# Patient Record
Sex: MALE | Race: WHITE | NOT HISPANIC OR LATINO | Employment: FULL TIME | URBAN - METROPOLITAN AREA
[De-identification: names, ages, dates, MRNs, and addresses within clinical notes are randomized per-mention and may not be internally consistent; named-entity substitution may affect disease eponyms.]

---

## 2020-03-14 ENCOUNTER — HISTORICAL (OUTPATIENT)
Dept: LAB | Facility: HOSPITAL | Age: 35
End: 2020-03-14

## 2020-09-19 ENCOUNTER — HISTORICAL (OUTPATIENT)
Dept: LAB | Facility: HOSPITAL | Age: 35
End: 2020-09-19

## 2020-09-19 LAB
ALBUMIN SERPL-MCNC: 4.2 GM/DL (ref 3.4–5)
ALBUMIN/GLOB SERPL: 1.3 RATIO (ref 1.1–2)
ALP SERPL-CCNC: 87 UNIT/L (ref 46–116)
ALT SERPL-CCNC: 72 UNIT/L (ref 12–78)
AST SERPL-CCNC: 35 UNIT/L (ref 15–37)
BILIRUB SERPL-MCNC: 0.8 MG/DL (ref 0.2–1)
BILIRUBIN DIRECT+TOT PNL SERPL-MCNC: 0.22 MG/DL (ref 0–0.2)
BILIRUBIN DIRECT+TOT PNL SERPL-MCNC: 0.58 MG/DL (ref 0–0.8)
BUN SERPL-MCNC: 17.9 MG/DL (ref 7–18)
CALCIUM SERPL-MCNC: 8.8 MG/DL (ref 8.5–10.1)
CHLORIDE SERPL-SCNC: 106 MMOL/L (ref 98–107)
CHOLEST SERPL-MCNC: 177 MG/DL (ref 0–200)
CHOLEST/HDLC SERPL: 3.1 {RATIO} (ref 0–5)
CO2 SERPL-SCNC: 28.2 MMOL/L (ref 21–32)
CREAT SERPL-MCNC: 0.98 MG/DL (ref 0.6–1.3)
GLOBULIN SER-MCNC: 3.2 GM/DL (ref 2.4–3.5)
GLUCOSE SERPL-MCNC: 102 MG/DL (ref 74–106)
HDLC SERPL-MCNC: 58 MG/DL (ref 40–60)
LDLC SERPL CALC-MCNC: 108 MG/DL (ref 0–129)
POTASSIUM SERPL-SCNC: 4.5 MMOL/L (ref 3.5–5.1)
PROT SERPL-MCNC: 7.4 GM/DL (ref 6.4–8.2)
SODIUM SERPL-SCNC: 140 MMOL/L (ref 136–145)
TRIGL SERPL-MCNC: 57 MG/DL
VLDLC SERPL CALC-MCNC: 11 MG/DL

## 2021-04-05 ENCOUNTER — HISTORICAL (OUTPATIENT)
Dept: LAB | Facility: HOSPITAL | Age: 36
End: 2021-04-05

## 2021-04-05 LAB
ABS NEUT (OLG): 6.97 X10(3)/MCL (ref 2.1–9.2)
ALBUMIN SERPL-MCNC: 4.7 GM/DL (ref 3.5–5)
ALBUMIN/GLOB SERPL: 1.4 RATIO (ref 1.1–2)
ALP SERPL-CCNC: 104 UNIT/L (ref 40–150)
ALT SERPL-CCNC: 80 UNIT/L (ref 0–55)
AST SERPL-CCNC: 48 UNIT/L (ref 5–34)
BASOPHILS # BLD AUTO: 0 X10(3)/MCL (ref 0–0.2)
BASOPHILS NFR BLD AUTO: 0 %
BILIRUB SERPL-MCNC: 1.9 MG/DL
BILIRUBIN DIRECT+TOT PNL SERPL-MCNC: 0.6 MG/DL (ref 0–0.5)
BILIRUBIN DIRECT+TOT PNL SERPL-MCNC: 1.3 MG/DL (ref 0–0.8)
BUN SERPL-MCNC: 24.8 MG/DL (ref 8.9–20.6)
CALCIUM SERPL-MCNC: 9.3 MG/DL (ref 8.4–10.2)
CHLORIDE SERPL-SCNC: 103 MMOL/L (ref 98–107)
CHOLEST SERPL-MCNC: 174 MG/DL
CHOLEST/HDLC SERPL: 3 {RATIO} (ref 0–5)
CO2 SERPL-SCNC: 21 MMOL/L (ref 22–29)
CREAT SERPL-MCNC: 1.13 MG/DL (ref 0.73–1.18)
EOSINOPHIL # BLD AUTO: 0.1 X10(3)/MCL (ref 0–0.9)
EOSINOPHIL NFR BLD AUTO: 1 %
ERYTHROCYTE [DISTWIDTH] IN BLOOD BY AUTOMATED COUNT: 13.7 % (ref 11.5–17)
GLOBULIN SER-MCNC: 3.4 GM/DL (ref 2.4–3.5)
GLUCOSE SERPL-MCNC: 79 MG/DL (ref 74–100)
HCT VFR BLD AUTO: 46.1 % (ref 42–52)
HDLC SERPL-MCNC: 56 MG/DL (ref 35–60)
HGB BLD-MCNC: 14.8 GM/DL (ref 14–18)
IMM GRANULOCYTES # BLD AUTO: 0.01 % (ref 0–0.02)
IMM GRANULOCYTES NFR BLD AUTO: 0.1 % (ref 0–0.43)
LDLC SERPL CALC-MCNC: 99 MG/DL (ref 50–140)
LYMPHOCYTES # BLD AUTO: 1.8 X10(3)/MCL (ref 0.6–4.6)
LYMPHOCYTES NFR BLD AUTO: 19 %
MCH RBC QN AUTO: 29.4 PG (ref 27–31)
MCHC RBC AUTO-ENTMCNC: 32.1 GM/DL (ref 33–36)
MCV RBC AUTO: 91.5 FL (ref 80–94)
MONOCYTES # BLD AUTO: 0.8 X10(3)/MCL (ref 0.1–1.3)
MONOCYTES NFR BLD AUTO: 8 %
NEUTROPHILS # BLD AUTO: 6.97 X10(3)/MCL (ref 1.4–7.9)
NEUTROPHILS NFR BLD AUTO: 72 %
PLATELET # BLD AUTO: 198 X10(3)/MCL (ref 130–400)
PMV BLD AUTO: 9.3 FL (ref 9.4–12.4)
POTASSIUM SERPL-SCNC: 4.1 MMOL/L (ref 3.5–5.1)
PROT SERPL-MCNC: 8.1 GM/DL (ref 6.4–8.3)
RBC # BLD AUTO: 5.04 X10(6)/MCL (ref 4.7–6.1)
SODIUM SERPL-SCNC: 139 MMOL/L (ref 136–145)
TRIGL SERPL-MCNC: 95 MG/DL (ref 34–140)
TSH SERPL-ACNC: 1.14 UIU/ML (ref 0.35–4.94)
VLDLC SERPL CALC-MCNC: 19 MG/DL
WBC # SPEC AUTO: 9.7 X10(3)/MCL (ref 4.5–11.5)

## 2021-04-14 ENCOUNTER — HISTORICAL (OUTPATIENT)
Dept: RADIOLOGY | Facility: HOSPITAL | Age: 36
End: 2021-04-14

## 2021-05-24 ENCOUNTER — HISTORICAL (OUTPATIENT)
Dept: LAB | Facility: HOSPITAL | Age: 36
End: 2021-05-24

## 2021-10-06 ENCOUNTER — HISTORICAL (OUTPATIENT)
Dept: LAB | Facility: HOSPITAL | Age: 36
End: 2021-10-06

## 2022-04-07 ENCOUNTER — HISTORICAL (OUTPATIENT)
Dept: ADMINISTRATIVE | Facility: HOSPITAL | Age: 37
End: 2022-04-07

## 2022-04-18 ENCOUNTER — HISTORICAL (OUTPATIENT)
Dept: LAB | Facility: HOSPITAL | Age: 37
End: 2022-04-18
Payer: COMMERCIAL

## 2022-04-18 LAB
ABS NEUT (OLG): 3.3 (ref 2.1–9.2)
ALBUMIN SERPL-MCNC: 4.2 G/DL (ref 3.5–5)
ALBUMIN/GLOB SERPL: 1.2 {RATIO} (ref 1.1–2)
ALP SERPL-CCNC: 97 U/L (ref 40–150)
ALT SERPL-CCNC: 81 U/L (ref 0–55)
AST SERPL-CCNC: 44 U/L (ref 5–34)
BASOPHILS # BLD AUTO: 0 10*3/UL (ref 0–0.2)
BASOPHILS NFR BLD AUTO: 1 %
BILIRUB SERPL-MCNC: 1.5 MG/DL
BILIRUBIN DIRECT+TOT PNL SERPL-MCNC: 0.5 (ref 0–0.5)
BILIRUBIN DIRECT+TOT PNL SERPL-MCNC: 1 (ref 0–0.8)
BUN SERPL-MCNC: 19.1 MG/DL (ref 8.9–20.6)
CALCIUM SERPL-MCNC: 9.4 MG/DL (ref 8.7–10.5)
CHLORIDE SERPL-SCNC: 104 MMOL/L (ref 98–107)
CHOLEST SERPL-MCNC: 175 MG/DL
CHOLEST/HDLC SERPL: 3 {RATIO} (ref 0–5)
CO2 SERPL-SCNC: 23 MMOL/L (ref 22–29)
CREAT SERPL-MCNC: 0.83 MG/DL (ref 0.73–1.18)
EOSINOPHIL # BLD AUTO: 0.2 10*3/UL (ref 0–0.9)
EOSINOPHIL NFR BLD AUTO: 4 %
ERYTHROCYTE [DISTWIDTH] IN BLOOD BY AUTOMATED COUNT: 12.5 % (ref 11.5–17)
GLOBULIN SER-MCNC: 3.5 G/DL (ref 2.4–3.5)
GLUCOSE SERPL-MCNC: 86 MG/DL (ref 74–100)
HCT VFR BLD AUTO: 42 % (ref 42–52)
HDLC SERPL-MCNC: 52 MG/DL (ref 35–60)
HEMOLYSIS INTERF INDEX SERPL-ACNC: 4
HGB BLD-MCNC: 13.7 G/DL (ref 14–18)
ICTERIC INTERF INDEX SERPL-ACNC: 2
LDLC SERPL CALC-MCNC: 103 MG/DL (ref 50–140)
LIPEMIC INTERF INDEX SERPL-ACNC: 4
LYMPHOCYTES # BLD AUTO: 2 10*3/UL (ref 0.6–4.6)
LYMPHOCYTES NFR BLD AUTO: 32 %
MANUAL DIFF? (OHS): NO
MCH RBC QN AUTO: 30.5 PG (ref 27–31)
MCHC RBC AUTO-ENTMCNC: 32.6 G/DL (ref 33–36)
MCV RBC AUTO: 93.5 FL (ref 80–94)
MONOCYTES # BLD AUTO: 0.6 10*3/UL (ref 0.1–1.3)
MONOCYTES NFR BLD AUTO: 9 %
NEUTROPHILS # BLD AUTO: 3.3 10*3/UL (ref 1.4–7.9)
NEUTROPHILS NFR BLD AUTO: 54 %
PLATELET # BLD AUTO: 177 10*3/UL (ref 130–400)
PMV BLD AUTO: 8.6 FL (ref 9.4–12.4)
POTASSIUM SERPL-SCNC: 4 MMOL/L (ref 3.5–5.1)
PROT SERPL-MCNC: 7.7 G/DL (ref 6.4–8.3)
RBC # BLD AUTO: 4.49 10*6/UL (ref 4.7–6.1)
SODIUM SERPL-SCNC: 140 MMOL/L (ref 136–145)
TRIGL SERPL-MCNC: 99 MG/DL (ref 34–140)
TSH SERPL-ACNC: 1.78 M[IU]/L (ref 0.35–4.94)
VLDLC SERPL CALC-MCNC: 20 MG/DL
WBC # SPEC AUTO: 6.1 10*3/UL (ref 4.5–11.5)

## 2022-04-24 VITALS
WEIGHT: 227.06 LBS | OXYGEN SATURATION: 94 % | HEIGHT: 68 IN | BODY MASS INDEX: 34.41 KG/M2 | DIASTOLIC BLOOD PRESSURE: 82 MMHG | SYSTOLIC BLOOD PRESSURE: 137 MMHG

## 2023-04-18 ENCOUNTER — TELEPHONE (OUTPATIENT)
Dept: FAMILY MEDICINE | Facility: CLINIC | Age: 38
End: 2023-04-18
Payer: COMMERCIAL

## 2023-04-18 DIAGNOSIS — Z00.00 ANNUAL PHYSICAL EXAM: Primary | ICD-10-CM

## 2023-04-18 DIAGNOSIS — Z11.4 SCREENING FOR HIV (HUMAN IMMUNODEFICIENCY VIRUS): ICD-10-CM

## 2023-04-18 DIAGNOSIS — Z11.59 NEED FOR HEPATITIS C SCREENING TEST: ICD-10-CM

## 2023-04-18 PROBLEM — E66.9 OBESITY: Status: ACTIVE | Noted: 2023-04-18

## 2023-04-18 PROBLEM — E78.5 HYPERLIPIDEMIA: Status: ACTIVE | Noted: 2023-04-18

## 2023-04-18 PROBLEM — K21.9 GASTROESOPHAGEAL REFLUX DISEASE: Status: ACTIVE | Noted: 2023-04-18

## 2023-04-18 PROBLEM — K76.0 STEATOSIS OF LIVER: Status: ACTIVE | Noted: 2023-04-18

## 2023-04-18 PROBLEM — G47.33 OBSTRUCTIVE SLEEP APNEA SYNDROME: Status: ACTIVE | Noted: 2023-04-18

## 2023-04-18 PROBLEM — R74.8 ELEVATED LIVER ENZYMES: Status: ACTIVE | Noted: 2023-04-18

## 2023-04-18 NOTE — TELEPHONE ENCOUNTER
Are there any outstanding tasks in patient's chart?    n  2. Do we have outstanding/pending referrals?  n    3. Has the patient been seen in an ER, Urgent Care, or admitted since last visit?  [facility]    4. Has patient seen any other health care providers since last visit?  n    5.  Has patient had any blood work or x-rays done since last visit?   N      Please order wellness labs needed at [facility]

## 2023-04-22 ENCOUNTER — LAB VISIT (OUTPATIENT)
Dept: LAB | Facility: HOSPITAL | Age: 38
End: 2023-04-22
Attending: NURSE PRACTITIONER
Payer: COMMERCIAL

## 2023-04-22 DIAGNOSIS — Z11.59 NEED FOR HEPATITIS C SCREENING TEST: ICD-10-CM

## 2023-04-22 DIAGNOSIS — D64.9 ANEMIA, UNSPECIFIED TYPE: ICD-10-CM

## 2023-04-22 DIAGNOSIS — Z11.4 SCREENING FOR HIV (HUMAN IMMUNODEFICIENCY VIRUS): ICD-10-CM

## 2023-04-22 DIAGNOSIS — Z00.00 ANNUAL PHYSICAL EXAM: ICD-10-CM

## 2023-04-22 LAB
ALBUMIN SERPL-MCNC: 3.9 G/DL (ref 3.5–5)
ALBUMIN/GLOB SERPL: 1.2 RATIO (ref 1.1–2)
ALP SERPL-CCNC: 114 UNIT/L (ref 40–150)
ALT SERPL-CCNC: 81 UNIT/L (ref 0–55)
AST SERPL-CCNC: 66 UNIT/L (ref 5–34)
BASOPHILS # BLD AUTO: 0.02 X10(3)/MCL (ref 0–0.2)
BASOPHILS NFR BLD AUTO: 0.4 %
BILIRUBIN DIRECT+TOT PNL SERPL-MCNC: 0.9 MG/DL
BUN SERPL-MCNC: 15.6 MG/DL (ref 8.9–20.6)
CALCIUM SERPL-MCNC: 9 MG/DL (ref 8.4–10.2)
CHLORIDE SERPL-SCNC: 108 MMOL/L (ref 98–107)
CHOLEST SERPL-MCNC: 178 MG/DL
CHOLEST/HDLC SERPL: 4 {RATIO} (ref 0–5)
CO2 SERPL-SCNC: 25 MMOL/L (ref 22–29)
CREAT SERPL-MCNC: 0.87 MG/DL (ref 0.73–1.18)
EOSINOPHIL # BLD AUTO: 0.25 X10(3)/MCL (ref 0–0.9)
EOSINOPHIL NFR BLD AUTO: 4.9 %
ERYTHROCYTE [DISTWIDTH] IN BLOOD BY AUTOMATED COUNT: 13.1 % (ref 11.5–17)
EST. AVERAGE GLUCOSE BLD GHB EST-MCNC: 114 MG/DL
GFR SERPLBLD CREATININE-BSD FMLA CKD-EPI: >60 MLS/MIN/1.73/M2
GLOBULIN SER-MCNC: 3.2 GM/DL (ref 2.4–3.5)
GLUCOSE SERPL-MCNC: 100 MG/DL (ref 74–100)
HBA1C MFR BLD: 5.6 %
HCT VFR BLD AUTO: 39.2 % (ref 42–52)
HDLC SERPL-MCNC: 49 MG/DL (ref 35–60)
HGB BLD-MCNC: 12.6 G/DL (ref 14–18)
IMM GRANULOCYTES # BLD AUTO: 0 X10(3)/MCL (ref 0–0.04)
IMM GRANULOCYTES NFR BLD AUTO: 0 %
LDLC SERPL CALC-MCNC: 109 MG/DL (ref 50–140)
LYMPHOCYTES # BLD AUTO: 2.02 X10(3)/MCL (ref 0.6–4.6)
LYMPHOCYTES NFR BLD AUTO: 39.5 %
MCH RBC QN AUTO: 27.8 PG (ref 27–31)
MCHC RBC AUTO-ENTMCNC: 32.1 G/DL (ref 33–36)
MCV RBC AUTO: 86.3 FL (ref 80–94)
MONOCYTES # BLD AUTO: 0.5 X10(3)/MCL (ref 0.1–1.3)
MONOCYTES NFR BLD AUTO: 9.8 %
NEUTROPHILS # BLD AUTO: 2.32 X10(3)/MCL (ref 2.1–9.2)
NEUTROPHILS NFR BLD AUTO: 45.4 %
PLATELET # BLD AUTO: 180 X10(3)/MCL (ref 130–400)
PMV BLD AUTO: 8.8 FL (ref 7.4–10.4)
POTASSIUM SERPL-SCNC: 4 MMOL/L (ref 3.5–5.1)
PROT SERPL-MCNC: 7.1 GM/DL (ref 6.4–8.3)
RBC # BLD AUTO: 4.54 X10(6)/MCL (ref 4.7–6.1)
SODIUM SERPL-SCNC: 141 MMOL/L (ref 136–145)
TRIGL SERPL-MCNC: 101 MG/DL (ref 34–140)
TSH SERPL-ACNC: 2.39 UIU/ML (ref 0.35–4.94)
VLDLC SERPL CALC-MCNC: 20 MG/DL
WBC # SPEC AUTO: 5.1 X10(3)/MCL (ref 4.5–11.5)

## 2023-04-22 PROCEDURE — 80053 COMPREHEN METABOLIC PANEL: CPT

## 2023-04-22 PROCEDURE — 80061 LIPID PANEL: CPT

## 2023-04-22 PROCEDURE — 86803 HEPATITIS C AB TEST: CPT

## 2023-04-22 PROCEDURE — 85025 COMPLETE CBC W/AUTO DIFF WBC: CPT

## 2023-04-22 PROCEDURE — 83036 HEMOGLOBIN GLYCOSYLATED A1C: CPT

## 2023-04-22 PROCEDURE — 87389 HIV-1 AG W/HIV-1&-2 AB AG IA: CPT

## 2023-04-22 PROCEDURE — 36415 COLL VENOUS BLD VENIPUNCTURE: CPT

## 2023-04-22 PROCEDURE — 83550 IRON BINDING TEST: CPT

## 2023-04-22 PROCEDURE — 84443 ASSAY THYROID STIM HORMONE: CPT

## 2023-04-24 LAB
HCV AB SERPL QL IA: NONREACTIVE
HIV 1+2 AB+HIV1 P24 AG SERPL QL IA: NONREACTIVE

## 2023-04-25 ENCOUNTER — PATIENT MESSAGE (OUTPATIENT)
Dept: FAMILY MEDICINE | Facility: CLINIC | Age: 38
End: 2023-04-25

## 2023-04-25 ENCOUNTER — OFFICE VISIT (OUTPATIENT)
Dept: FAMILY MEDICINE | Facility: CLINIC | Age: 38
End: 2023-04-25
Payer: COMMERCIAL

## 2023-04-25 VITALS
OXYGEN SATURATION: 97 % | WEIGHT: 251.81 LBS | BODY MASS INDEX: 38.16 KG/M2 | RESPIRATION RATE: 16 BRPM | HEART RATE: 85 BPM | SYSTOLIC BLOOD PRESSURE: 124 MMHG | DIASTOLIC BLOOD PRESSURE: 72 MMHG | TEMPERATURE: 99 F | HEIGHT: 68 IN

## 2023-04-25 DIAGNOSIS — K76.0 STEATOSIS OF LIVER: ICD-10-CM

## 2023-04-25 DIAGNOSIS — D64.9 ANEMIA, UNSPECIFIED TYPE: ICD-10-CM

## 2023-04-25 DIAGNOSIS — E66.01 MORBID OBESITY: ICD-10-CM

## 2023-04-25 DIAGNOSIS — Z11.4 SCREENING FOR HIV (HUMAN IMMUNODEFICIENCY VIRUS): ICD-10-CM

## 2023-04-25 DIAGNOSIS — E78.5 HYPERLIPIDEMIA, UNSPECIFIED HYPERLIPIDEMIA TYPE: ICD-10-CM

## 2023-04-25 DIAGNOSIS — Z00.00 ANNUAL PHYSICAL EXAM: Primary | ICD-10-CM

## 2023-04-25 DIAGNOSIS — R74.8 ELEVATED LIVER ENZYMES: ICD-10-CM

## 2023-04-25 DIAGNOSIS — Z11.59 NEED FOR HEPATITIS C SCREENING TEST: ICD-10-CM

## 2023-04-25 DIAGNOSIS — G47.33 OBSTRUCTIVE SLEEP APNEA SYNDROME: ICD-10-CM

## 2023-04-25 LAB
IRON SATN MFR SERPL: 24 % (ref 20–50)
IRON SERPL-MCNC: 82 UG/DL (ref 65–175)
TIBC SERPL-MCNC: 263 UG/DL (ref 69–240)
TIBC SERPL-MCNC: 345 UG/DL (ref 250–450)
TRANSFERRIN SERPL-MCNC: 321 MG/DL (ref 174–364)

## 2023-04-25 PROCEDURE — 3078F DIAST BP <80 MM HG: CPT | Mod: CPTII,,, | Performed by: NURSE PRACTITIONER

## 2023-04-25 PROCEDURE — 3078F PR MOST RECENT DIASTOLIC BLOOD PRESSURE < 80 MM HG: ICD-10-PCS | Mod: CPTII,,, | Performed by: NURSE PRACTITIONER

## 2023-04-25 PROCEDURE — 1159F MED LIST DOCD IN RCRD: CPT | Mod: CPTII,,, | Performed by: NURSE PRACTITIONER

## 2023-04-25 PROCEDURE — 1160F RVW MEDS BY RX/DR IN RCRD: CPT | Mod: CPTII,,, | Performed by: NURSE PRACTITIONER

## 2023-04-25 PROCEDURE — 1159F PR MEDICATION LIST DOCUMENTED IN MEDICAL RECORD: ICD-10-PCS | Mod: CPTII,,, | Performed by: NURSE PRACTITIONER

## 2023-04-25 PROCEDURE — 3008F PR BODY MASS INDEX (BMI) DOCUMENTED: ICD-10-PCS | Mod: CPTII,,, | Performed by: NURSE PRACTITIONER

## 2023-04-25 PROCEDURE — 3074F PR MOST RECENT SYSTOLIC BLOOD PRESSURE < 130 MM HG: ICD-10-PCS | Mod: CPTII,,, | Performed by: NURSE PRACTITIONER

## 2023-04-25 PROCEDURE — 1160F PR REVIEW ALL MEDS BY PRESCRIBER/CLIN PHARMACIST DOCUMENTED: ICD-10-PCS | Mod: CPTII,,, | Performed by: NURSE PRACTITIONER

## 2023-04-25 PROCEDURE — 3008F BODY MASS INDEX DOCD: CPT | Mod: CPTII,,, | Performed by: NURSE PRACTITIONER

## 2023-04-25 PROCEDURE — 99395 PR PREVENTIVE VISIT,EST,18-39: ICD-10-PCS | Mod: ,,, | Performed by: NURSE PRACTITIONER

## 2023-04-25 PROCEDURE — 99395 PREV VISIT EST AGE 18-39: CPT | Mod: ,,, | Performed by: NURSE PRACTITIONER

## 2023-04-25 PROCEDURE — 3074F SYST BP LT 130 MM HG: CPT | Mod: CPTII,,, | Performed by: NURSE PRACTITIONER

## 2023-04-25 RX ORDER — ACETAMINOPHEN 160 MG/5ML
200 SUSPENSION, ORAL (FINAL DOSE FORM) ORAL DAILY
COMMUNITY

## 2023-04-25 RX ORDER — SEMAGLUTIDE 0.25 MG/.5ML
0.25 INJECTION, SOLUTION SUBCUTANEOUS
Qty: 2 ML | Refills: 0 | Status: SHIPPED | OUTPATIENT
Start: 2023-04-25 | End: 2023-05-18 | Stop reason: DRUGHIGH

## 2023-04-25 NOTE — PROGRESS NOTES
Subjective:       Patient ID: Luz Larsen is a 38 y.o. male.    Chief Complaint: Annual Exam      HPI   This is a 38-year-old white male who presents to clinic today for an annual wellness exam.  Patient states overall he is feeling okay.  Does state that he is still having a lot of trouble losing weight.  Does not matter if he eats a little or eats a lot he can not seem to lose the weight.  Review of Systems  Comprehensive review of systems negative except as stated in HPI    The patient's Health Maintenance was reviewed and the following appears to be due:   There are no preventive care reminders to display for this patient.    Past Medical History:  Past Medical History:   Diagnosis Date    Elevated liver enzymes     GERD (gastroesophageal reflux disease)     Hyperlipidemia     Obstructive sleep apnea     Steatosis of liver      Past Surgical History:   Procedure Laterality Date    TONSILLECTOMY AND ADENOIDECTOMY       Review of patient's allergies indicates:   Allergen Reactions    Azithromycin Rash     Current Outpatient Medications on File Prior to Visit   Medication Sig Dispense Refill    coenzyme Q10 200 mg capsule Take 200 mg by mouth once daily.      montelukast (SINGULAIR) 10 mg tablet TAKE 1 TABLET BY MOUTH EVERY EVENING 90 tablet 2    pantoprazole (PROTONIX) 40 MG tablet TAKE 1 TABLET BY MOUTH EVERY DAY 90 tablet 3    rosuvastatin (CRESTOR) 20 MG tablet TAKE 1 TABLET BY MOUTH EVERY DAY 90 tablet 2     No current facility-administered medications on file prior to visit.     Social History     Socioeconomic History    Marital status:    Tobacco Use    Smoking status: Never     Passive exposure: Past    Smokeless tobacco: Never   Substance and Sexual Activity    Alcohol use: Yes     Comment: seldom    Drug use: Never    Sexual activity: Yes     Partners: Female     Social Determinants of Health     Financial Resource Strain: Low Risk     Difficulty of Paying Living Expenses: Not very hard   Food  "Insecurity: No Food Insecurity    Worried About Running Out of Food in the Last Year: Never true    Ran Out of Food in the Last Year: Never true   Transportation Needs: No Transportation Needs    Lack of Transportation (Medical): No    Lack of Transportation (Non-Medical): No   Physical Activity: Insufficiently Active    Days of Exercise per Week: 2 days    Minutes of Exercise per Session: 10 min   Stress: No Stress Concern Present    Feeling of Stress : Only a little   Social Connections: Moderately Isolated    Frequency of Communication with Friends and Family: More than three times a week    Frequency of Social Gatherings with Friends and Family: Once a week    Attends Pentecostalism Services: Never    Active Member of Clubs or Organizations: No    Attends Club or Organization Meetings: Never    Marital Status:    Housing Stability: Low Risk     Unable to Pay for Housing in the Last Year: No    Number of Places Lived in the Last Year: 1    Unstable Housing in the Last Year: No     History reviewed. No pertinent family history.    Objective:       /72 (BP Location: Left arm)   Pulse 85   Temp 98.5 °F (36.9 °C) (Oral)   Resp 16   Ht 5' 8" (1.727 m)   Wt 114.2 kg (251 lb 12.8 oz)   SpO2 97%   BMI 38.29 kg/m²      Physical Exam  Vitals and nursing note reviewed.   Constitutional:       Appearance: Normal appearance. He is obese.   HENT:      Head: Normocephalic and atraumatic.      Right Ear: Tympanic membrane, ear canal and external ear normal.      Left Ear: Tympanic membrane, ear canal and external ear normal.      Nose: Nose normal.      Mouth/Throat:      Mouth: Mucous membranes are moist.      Pharynx: Oropharynx is clear.   Eyes:      Extraocular Movements: Extraocular movements intact.      Conjunctiva/sclera: Conjunctivae normal.      Pupils: Pupils are equal, round, and reactive to light.   Cardiovascular:      Rate and Rhythm: Normal rate and regular rhythm.      Pulses: Normal pulses.      " Heart sounds: Normal heart sounds.   Pulmonary:      Effort: Pulmonary effort is normal.      Breath sounds: Normal breath sounds.   Abdominal:      General: Abdomen is flat. Bowel sounds are normal.      Palpations: Abdomen is soft.   Musculoskeletal:         General: Normal range of motion.      Cervical back: Normal range of motion and neck supple.   Skin:     General: Skin is warm and dry.   Neurological:      General: No focal deficit present.      Mental Status: He is alert and oriented to person, place, and time.   Psychiatric:         Mood and Affect: Mood normal.         Behavior: Behavior normal.         Thought Content: Thought content normal.         Judgment: Judgment normal.       Labs  Lab Visit on 04/22/2023   Component Date Value Ref Range Status    Sodium Level 04/22/2023 141  136 - 145 mmol/L Final    Potassium Level 04/22/2023 4.0  3.5 - 5.1 mmol/L Final    Chloride 04/22/2023 108 (H)  98 - 107 mmol/L Final    Carbon Dioxide 04/22/2023 25  22 - 29 mmol/L Final    Glucose Level 04/22/2023 100  74 - 100 mg/dL Final    Blood Urea Nitrogen 04/22/2023 15.6  8.9 - 20.6 mg/dL Final    Creatinine 04/22/2023 0.87  0.73 - 1.18 mg/dL Final    Calcium Level Total 04/22/2023 9.0  8.4 - 10.2 mg/dL Final    Protein Total 04/22/2023 7.1  6.4 - 8.3 gm/dL Final    Albumin Level 04/22/2023 3.9  3.5 - 5.0 g/dL Final    Globulin 04/22/2023 3.2  2.4 - 3.5 gm/dL Final    Albumin/Globulin Ratio 04/22/2023 1.2  1.1 - 2.0 ratio Final    Bilirubin Total 04/22/2023 0.9  <=1.5 mg/dL Final    Alkaline Phosphatase 04/22/2023 114  40 - 150 unit/L Final    Alanine Aminotransferase 04/22/2023 81 (H)  0 - 55 unit/L Final    Aspartate Aminotransferase 04/22/2023 66 (H)  5 - 34 unit/L Final    eGFR 04/22/2023 >60  mls/min/1.73/m2 Final    Cholesterol Total 04/22/2023 178  <=200 mg/dL Final    HDL Cholesterol 04/22/2023 49  35 - 60 mg/dL Final    Triglyceride 04/22/2023 101  34 - 140 mg/dL Final    Cholesterol/HDL Ratio 04/22/2023 4   0 - 5 Final    Very Low Density Lipoprotein 04/22/2023 20   Final    LDL Cholesterol 04/22/2023 109.00  50.00 - 140.00 mg/dL Final    Thyroid Stimulating Hormone 04/22/2023 2.392  0.350 - 4.940 uIU/mL Final    Hemoglobin A1c 04/22/2023 5.6  <=7.0 % Final    Estimated Average Glucose 04/22/2023 114.0  mg/dL Final    Hepatitis C Antibody 04/22/2023 Nonreactive  Nonreactive Final    HIV 04/22/2023 Nonreactive  Nonreactive Final    WBC 04/22/2023 5.1  4.5 - 11.5 x10(3)/mcL Final    RBC 04/22/2023 4.54 (L)  4.70 - 6.10 x10(6)/mcL Final    Hgb 04/22/2023 12.6 (L)  14.0 - 18.0 g/dL Final    Hct 04/22/2023 39.2 (L)  42.0 - 52.0 % Final    MCV 04/22/2023 86.3  80.0 - 94.0 fL Final    MCH 04/22/2023 27.8  27.0 - 31.0 pg Final    MCHC 04/22/2023 32.1 (L)  33.0 - 36.0 g/dL Final    RDW 04/22/2023 13.1  11.5 - 17.0 % Final    Platelet 04/22/2023 180  130 - 400 x10(3)/mcL Final    MPV 04/22/2023 8.8  7.4 - 10.4 fL Final    Neut % 04/22/2023 45.4  % Final    Lymph % 04/22/2023 39.5  % Final    Mono % 04/22/2023 9.8  % Final    Eos % 04/22/2023 4.9  % Final    Basophil % 04/22/2023 0.4  % Final    Lymph # 04/22/2023 2.02  0.6 - 4.6 x10(3)/mcL Final    Neut # 04/22/2023 2.32  2.1 - 9.2 x10(3)/mcL Final    Mono # 04/22/2023 0.50  0.1 - 1.3 x10(3)/mcL Final    Eos # 04/22/2023 0.25  0 - 0.9 x10(3)/mcL Final    Baso # 04/22/2023 0.02  0 - 0.2 x10(3)/mcL Final    IG# 04/22/2023 0.00  0 - 0.04 x10(3)/mcL Final    IG% 04/22/2023 0.0  % Final    Iron Binding Capacity Unsaturated 04/22/2023 263 (H)  69 - 240 ug/dL Final    Iron Level 04/22/2023 82  65 - 175 ug/dL Final    Transferrin 04/22/2023 321  174 - 364 mg/dL Final    Iron Binding Capacity Total 04/22/2023 345  250 - 450 ug/dL Final    Iron Saturation 04/22/2023 24  20 - 50 % Final       Assessment and Plan       ICD-10-CM ICD-9-CM   1. Annual physical exam  Z00.00 V70.0   2. Anemia, unspecified type  D64.9 285.9   3. Morbid obesity  E66.01 278.01   4. Need for hepatitis C screening  test  Z11.59 V73.89   5. Screening for HIV (human immunodeficiency virus)  Z11.4 V73.89   6. Hyperlipidemia, unspecified hyperlipidemia type  E78.5 272.4   7. Steatosis of liver  K76.0 571.8   8. Elevated liver enzymes  R74.8 790.5   9. Obstructive sleep apnea syndrome  G47.33 327.23        1. Annual physical exam  Overview:  Annual exam yearly in April 2. Anemia, unspecified type  Assessment & Plan:  Mild normocytic anemia, red blood cell 4.59, H&H 12.6 and 39.2.  Iron panel normal.  Will repeat CBC and get stool for occult blood in 3 months.    Orders:  -     Iron and TIBC; Future; Expected date: 04/25/2023  -     CBC Auto Differential; Future; Expected date: 07/25/2023  -     Occult Blood, Stool Screening (1 -3); Future; Expected date: 07/25/2023    3. Morbid obesity  Assessment & Plan:  Trial of Wegovy 0.25 mg subQ weekly for 4 weeks.  Will increase dose every 4 weeks.  Follow-up in clinic 3 months.    Orders:  -     semaglutide, weight loss, (WEGOVY) 0.25 mg/0.5 mL PnIj; Inject 0.25 mg into the skin every 7 days.  Dispense: 2 mL; Refill: 0    4. Need for hepatitis C screening test  Comments:  Nonreactive    5. Screening for HIV (human immunodeficiency virus)  Comments:  Nonreactive    6. Hyperlipidemia, unspecified hyperlipidemia type  Overview:  Diet controlled    Assessment & Plan:  Total cholesterol 178, .  Repeat lipids in 1 year.      7. Steatosis of liver  Overview:  04/14/2021 - ultrasound liver with hepatic steatosis noted    Assessment & Plan:  ALT 81, AST 66.  Again encouraged to avoid alcohol.  Follow-up with repeat labs in 1 year.      8. Elevated liver enzymes  Overview:  Secondary to nonalcoholic fatty liver disease    Assessment & Plan:  ALT 81, AST 66, repeat 1 year.      9. Obstructive sleep apnea syndrome  Overview:  Home sleep study with [facility] 04/20/2021  AHI 28.1  Auto Pap 5-20 cm H20     Assessment & Plan:  Stable, reports nightly CPAP compliance.  Continue with  nightly auto PAP therapy, follow-up 1 year.             Follow up in about 3 months (around 7/25/2023) for follow up obesity .

## 2023-04-25 NOTE — ASSESSMENT & PLAN NOTE
Stable, reports nightly CPAP compliance.  Continue with nightly auto PAP therapy, follow-up 1 year.

## 2023-04-25 NOTE — ASSESSMENT & PLAN NOTE
Trial of Wegovy 0.25 mg subQ weekly for 4 weeks.  Will increase dose every 4 weeks.  Follow-up in clinic 3 months.

## 2023-04-25 NOTE — ASSESSMENT & PLAN NOTE
Mild normocytic anemia, red blood cell 4.59, H&H 12.6 and 39.2.  Iron panel normal.  Will repeat CBC and get stool for occult blood in 3 months.

## 2023-05-18 ENCOUNTER — PATIENT MESSAGE (OUTPATIENT)
Dept: FAMILY MEDICINE | Facility: CLINIC | Age: 38
End: 2023-05-18
Payer: COMMERCIAL

## 2023-05-18 DIAGNOSIS — E66.01 MORBID OBESITY: Primary | ICD-10-CM

## 2023-05-18 RX ORDER — SEMAGLUTIDE 0.5 MG/.5ML
0.5 INJECTION, SOLUTION SUBCUTANEOUS
Qty: 2 ML | Refills: 1 | Status: SHIPPED | OUTPATIENT
Start: 2023-05-18 | End: 2023-06-02

## 2023-06-02 ENCOUNTER — PATIENT MESSAGE (OUTPATIENT)
Dept: FAMILY MEDICINE | Facility: CLINIC | Age: 38
End: 2023-06-02
Payer: COMMERCIAL

## 2023-06-02 DIAGNOSIS — E66.01 MORBID OBESITY: ICD-10-CM

## 2023-06-02 RX ORDER — SEMAGLUTIDE 1 MG/.5ML
1 INJECTION, SOLUTION SUBCUTANEOUS
Qty: 2 ML | Refills: 3 | Status: SHIPPED | OUTPATIENT
Start: 2023-06-02 | End: 2023-06-26

## 2023-06-02 RX ORDER — SEMAGLUTIDE 0.5 MG/.5ML
0.5 INJECTION, SOLUTION SUBCUTANEOUS
Qty: 2 ML | Refills: 1 | OUTPATIENT
Start: 2023-06-02

## 2023-06-26 ENCOUNTER — PATIENT MESSAGE (OUTPATIENT)
Dept: FAMILY MEDICINE | Facility: CLINIC | Age: 38
End: 2023-06-26
Payer: COMMERCIAL

## 2023-06-26 DIAGNOSIS — E66.01 MORBID OBESITY: Primary | ICD-10-CM

## 2023-06-26 RX ORDER — SEMAGLUTIDE 1.7 MG/.75ML
1.7 INJECTION, SOLUTION SUBCUTANEOUS
Qty: 3 ML | Refills: 2 | Status: SHIPPED | OUTPATIENT
Start: 2023-06-26 | End: 2023-07-27

## 2023-06-26 RX ORDER — SEMAGLUTIDE 1.7 MG/.75ML
1.7 INJECTION, SOLUTION SUBCUTANEOUS
Qty: 3 ML | Refills: 2 | Status: SHIPPED | OUTPATIENT
Start: 2023-06-26 | End: 2023-06-26 | Stop reason: CLARIF

## 2023-07-20 ENCOUNTER — TELEPHONE (OUTPATIENT)
Dept: FAMILY MEDICINE | Facility: CLINIC | Age: 38
End: 2023-07-20
Payer: COMMERCIAL

## 2023-07-20 DIAGNOSIS — D64.9 ANEMIA, UNSPECIFIED TYPE: Primary | ICD-10-CM

## 2023-07-20 NOTE — TELEPHONE ENCOUNTER
Are there any outstanding tasks in patient's chart?  labs    2. Do we have outstanding/pending referrals?  n    3. Has the patient been seen in an ER, Urgent Care, or admitted since last visit?  n    4. Has patient seen any other health care providers since last visit?  Dermatologist-wart removal    5.  Has patient had any blood work or x-rays done since last visit?    Will complete labs at [facility] prior to visit

## 2023-07-25 ENCOUNTER — LAB VISIT (OUTPATIENT)
Dept: LAB | Facility: HOSPITAL | Age: 38
End: 2023-07-25
Attending: NURSE PRACTITIONER
Payer: COMMERCIAL

## 2023-07-25 DIAGNOSIS — D64.9 ANEMIA, UNSPECIFIED TYPE: ICD-10-CM

## 2023-07-25 LAB
BASOPHILS # BLD AUTO: 0.01 X10(3)/MCL
BASOPHILS NFR BLD AUTO: 0.2 %
EOSINOPHIL # BLD AUTO: 0.15 X10(3)/MCL (ref 0–0.9)
EOSINOPHIL NFR BLD AUTO: 3.3 %
ERYTHROCYTE [DISTWIDTH] IN BLOOD BY AUTOMATED COUNT: 14.7 % (ref 11.5–17)
HCT VFR BLD AUTO: 40.6 % (ref 42–52)
HGB BLD-MCNC: 12.9 G/DL (ref 14–18)
IMM GRANULOCYTES # BLD AUTO: 0 X10(3)/MCL (ref 0–0.04)
IMM GRANULOCYTES NFR BLD AUTO: 0 %
LYMPHOCYTES # BLD AUTO: 1.86 X10(3)/MCL (ref 0.6–4.6)
LYMPHOCYTES NFR BLD AUTO: 40.9 %
MCH RBC QN AUTO: 28.6 PG (ref 27–31)
MCHC RBC AUTO-ENTMCNC: 31.8 G/DL (ref 33–36)
MCV RBC AUTO: 90 FL (ref 80–94)
MONOCYTES # BLD AUTO: 0.44 X10(3)/MCL (ref 0.1–1.3)
MONOCYTES NFR BLD AUTO: 9.7 %
NEUTROPHILS # BLD AUTO: 2.09 X10(3)/MCL (ref 2.1–9.2)
NEUTROPHILS NFR BLD AUTO: 45.9 %
PLATELET # BLD AUTO: 161 X10(3)/MCL (ref 130–400)
PMV BLD AUTO: 9.4 FL (ref 7.4–10.4)
RBC # BLD AUTO: 4.51 X10(6)/MCL (ref 4.7–6.1)
WBC # SPEC AUTO: 4.55 X10(3)/MCL (ref 4.5–11.5)

## 2023-07-25 PROCEDURE — 36415 COLL VENOUS BLD VENIPUNCTURE: CPT

## 2023-07-25 PROCEDURE — 85025 COMPLETE CBC W/AUTO DIFF WBC: CPT

## 2023-07-27 ENCOUNTER — OFFICE VISIT (OUTPATIENT)
Dept: FAMILY MEDICINE | Facility: CLINIC | Age: 38
End: 2023-07-27
Payer: COMMERCIAL

## 2023-07-27 VITALS
BODY MASS INDEX: 33.98 KG/M2 | HEIGHT: 68 IN | SYSTOLIC BLOOD PRESSURE: 110 MMHG | HEART RATE: 77 BPM | OXYGEN SATURATION: 98 % | TEMPERATURE: 99 F | RESPIRATION RATE: 16 BRPM | WEIGHT: 224.19 LBS | DIASTOLIC BLOOD PRESSURE: 78 MMHG

## 2023-07-27 DIAGNOSIS — D64.9 ANEMIA, UNSPECIFIED TYPE: ICD-10-CM

## 2023-07-27 DIAGNOSIS — E66.01 MORBID OBESITY: Primary | ICD-10-CM

## 2023-07-27 PROCEDURE — 3008F PR BODY MASS INDEX (BMI) DOCUMENTED: ICD-10-PCS | Mod: CPTII,,, | Performed by: NURSE PRACTITIONER

## 2023-07-27 PROCEDURE — 3078F DIAST BP <80 MM HG: CPT | Mod: CPTII,,, | Performed by: NURSE PRACTITIONER

## 2023-07-27 PROCEDURE — 3074F PR MOST RECENT SYSTOLIC BLOOD PRESSURE < 130 MM HG: ICD-10-PCS | Mod: CPTII,,, | Performed by: NURSE PRACTITIONER

## 2023-07-27 PROCEDURE — 3044F PR MOST RECENT HEMOGLOBIN A1C LEVEL <7.0%: ICD-10-PCS | Mod: CPTII,,, | Performed by: NURSE PRACTITIONER

## 2023-07-27 PROCEDURE — 1159F PR MEDICATION LIST DOCUMENTED IN MEDICAL RECORD: ICD-10-PCS | Mod: CPTII,,, | Performed by: NURSE PRACTITIONER

## 2023-07-27 PROCEDURE — 3078F PR MOST RECENT DIASTOLIC BLOOD PRESSURE < 80 MM HG: ICD-10-PCS | Mod: CPTII,,, | Performed by: NURSE PRACTITIONER

## 2023-07-27 PROCEDURE — 1159F MED LIST DOCD IN RCRD: CPT | Mod: CPTII,,, | Performed by: NURSE PRACTITIONER

## 2023-07-27 PROCEDURE — 99213 PR OFFICE/OUTPT VISIT, EST, LEVL III, 20-29 MIN: ICD-10-PCS | Mod: ,,, | Performed by: NURSE PRACTITIONER

## 2023-07-27 PROCEDURE — 99213 OFFICE O/P EST LOW 20 MIN: CPT | Mod: ,,, | Performed by: NURSE PRACTITIONER

## 2023-07-27 PROCEDURE — 3008F BODY MASS INDEX DOCD: CPT | Mod: CPTII,,, | Performed by: NURSE PRACTITIONER

## 2023-07-27 PROCEDURE — 1160F RVW MEDS BY RX/DR IN RCRD: CPT | Mod: CPTII,,, | Performed by: NURSE PRACTITIONER

## 2023-07-27 PROCEDURE — 1160F PR REVIEW ALL MEDS BY PRESCRIBER/CLIN PHARMACIST DOCUMENTED: ICD-10-PCS | Mod: CPTII,,, | Performed by: NURSE PRACTITIONER

## 2023-07-27 PROCEDURE — 3044F HG A1C LEVEL LT 7.0%: CPT | Mod: CPTII,,, | Performed by: NURSE PRACTITIONER

## 2023-07-27 PROCEDURE — 3074F SYST BP LT 130 MM HG: CPT | Mod: CPTII,,, | Performed by: NURSE PRACTITIONER

## 2023-07-27 NOTE — ASSESSMENT & PLAN NOTE
H&H 12.9 and 40.6.  Slightly improved from 3 months ago.  Patient was unable to complete stool for occult blood collection, encouraged to complete.

## 2023-07-27 NOTE — ASSESSMENT & PLAN NOTE
Much improved, patient down 27 lb in 3 months.  Increase Wegovy to 2.4 mg weekly.  Follow-up 3 months.

## 2023-07-27 NOTE — PROGRESS NOTES
Subjective:       Patient ID: Luz Larsen is a 38 y.o. male.    Chief Complaint: Anemia (3 month f/u) and Obesity (3 month f/u)      HPI   This is a 38-year-old white male who presents to clinic today for three-month follow-up for obesity.  Patient states that he is down 27 lb in the last 3 months.  States he is doing very well with the Wegovy.  Denies any severe side effects.  Does have some mild nausea.  States overall he feels much better.  He is exercising as well.  Would like to go up on his dose if possible.  Review of Systems  Comprehensive review of systems negative except as stated in HPI    The patient's Health Maintenance was reviewed and the following appears to be due:   There are no preventive care reminders to display for this patient.    Past Medical History:  Past Medical History:   Diagnosis Date    Elevated liver enzymes     GERD (gastroesophageal reflux disease)     Hyperlipidemia     Obstructive sleep apnea     Steatosis of liver      Past Surgical History:   Procedure Laterality Date    TONSILLECTOMY AND ADENOIDECTOMY       Review of patient's allergies indicates:   Allergen Reactions    Azithromycin Rash     Current Outpatient Medications on File Prior to Visit   Medication Sig Dispense Refill    coenzyme Q10 200 mg capsule Take 200 mg by mouth once daily.      MILK THISTLE ORAL Take 1,000 mg by mouth.      montelukast (SINGULAIR) 10 mg tablet TAKE 1 TABLET BY MOUTH EVERY DAY IN THE EVENING 90 tablet 3    pantoprazole (PROTONIX) 40 MG tablet TAKE 1 TABLET BY MOUTH EVERY DAY 90 tablet 3    rosuvastatin (CRESTOR) 20 MG tablet TAKE 1 TABLET BY MOUTH EVERY DAY 90 tablet 3    [DISCONTINUED] semaglutide, weight loss, (WEGOVY) 1.7 mg/0.75 mL PnIj Inject 1.7 mg into the skin every 7 days. 3 mL 2     No current facility-administered medications on file prior to visit.     Social History     Socioeconomic History    Marital status:    Tobacco Use    Smoking status: Never     Passive exposure:  "Past    Smokeless tobacco: Never   Substance and Sexual Activity    Alcohol use: Yes     Comment: seldom    Drug use: Never    Sexual activity: Yes     Partners: Female     Social Determinants of Health     Financial Resource Strain: Low Risk     Difficulty of Paying Living Expenses: Not very hard   Food Insecurity: No Food Insecurity    Worried About Running Out of Food in the Last Year: Never true    Ran Out of Food in the Last Year: Never true   Transportation Needs: No Transportation Needs    Lack of Transportation (Medical): No    Lack of Transportation (Non-Medical): No   Physical Activity: Insufficiently Active    Days of Exercise per Week: 2 days    Minutes of Exercise per Session: 10 min   Stress: No Stress Concern Present    Feeling of Stress : Only a little   Social Connections: Moderately Isolated    Frequency of Communication with Friends and Family: More than three times a week    Frequency of Social Gatherings with Friends and Family: Once a week    Attends Evangelical Services: Never    Active Member of Clubs or Organizations: No    Attends Club or Organization Meetings: Never    Marital Status:    Housing Stability: Low Risk     Unable to Pay for Housing in the Last Year: No    Number of Places Lived in the Last Year: 1    Unstable Housing in the Last Year: No     History reviewed. No pertinent family history.    Objective:       /78 (BP Location: Left arm)   Pulse 77   Temp 98.7 °F (37.1 °C) (Oral)   Resp 16   Ht 5' 8" (1.727 m)   Wt 101.7 kg (224 lb 3.2 oz)   SpO2 98%   BMI 34.09 kg/m²      Physical Exam  Vitals and nursing note reviewed.   Constitutional:       Appearance: Normal appearance. He is obese.   HENT:      Head: Normocephalic and atraumatic.      Right Ear: Tympanic membrane, ear canal and external ear normal.      Left Ear: Tympanic membrane, ear canal and external ear normal.      Nose: Nose normal.      Mouth/Throat:      Mouth: Mucous membranes are moist.      " Pharynx: Oropharynx is clear.   Eyes:      Extraocular Movements: Extraocular movements intact.      Conjunctiva/sclera: Conjunctivae normal.      Pupils: Pupils are equal, round, and reactive to light.   Cardiovascular:      Rate and Rhythm: Normal rate and regular rhythm.      Pulses: Normal pulses.      Heart sounds: Normal heart sounds.   Pulmonary:      Effort: Pulmonary effort is normal.      Breath sounds: Normal breath sounds.   Musculoskeletal:         General: Normal range of motion.      Cervical back: Normal range of motion and neck supple.   Skin:     General: Skin is warm and dry.   Neurological:      General: No focal deficit present.      Mental Status: He is alert and oriented to person, place, and time.   Psychiatric:         Mood and Affect: Mood normal.         Behavior: Behavior normal.         Thought Content: Thought content normal.         Judgment: Judgment normal.       Labs  Lab Visit on 07/25/2023   Component Date Value Ref Range Status    WBC 07/25/2023 4.55  4.50 - 11.50 x10(3)/mcL Final    RBC 07/25/2023 4.51 (L)  4.70 - 6.10 x10(6)/mcL Final    Hgb 07/25/2023 12.9 (L)  14.0 - 18.0 g/dL Final    Hct 07/25/2023 40.6 (L)  42.0 - 52.0 % Final    MCV 07/25/2023 90.0  80.0 - 94.0 fL Final    MCH 07/25/2023 28.6  27.0 - 31.0 pg Final    MCHC 07/25/2023 31.8 (L)  33.0 - 36.0 g/dL Final    RDW 07/25/2023 14.7  11.5 - 17.0 % Final    Platelet 07/25/2023 161  130 - 400 x10(3)/mcL Final    MPV 07/25/2023 9.4  7.4 - 10.4 fL Final    Neut % 07/25/2023 45.9  % Final    Lymph % 07/25/2023 40.9  % Final    Mono % 07/25/2023 9.7  % Final    Eos % 07/25/2023 3.3  % Final    Basophil % 07/25/2023 0.2  % Final    Lymph # 07/25/2023 1.86  0.6 - 4.6 x10(3)/mcL Final    Neut # 07/25/2023 2.09 (L)  2.1 - 9.2 x10(3)/mcL Final    Mono # 07/25/2023 0.44  0.1 - 1.3 x10(3)/mcL Final    Eos # 07/25/2023 0.15  0 - 0.9 x10(3)/mcL Final    Baso # 07/25/2023 0.01  <=0.2 x10(3)/mcL Final    IG# 07/25/2023 0.00  0 - 0.04  x10(3)/mcL Final    IG% 07/25/2023 0.0  % Final   Lab Visit on 04/22/2023   Component Date Value Ref Range Status    Sodium Level 04/22/2023 141  136 - 145 mmol/L Final    Potassium Level 04/22/2023 4.0  3.5 - 5.1 mmol/L Final    Chloride 04/22/2023 108 (H)  98 - 107 mmol/L Final    Carbon Dioxide 04/22/2023 25  22 - 29 mmol/L Final    Glucose Level 04/22/2023 100  74 - 100 mg/dL Final    Blood Urea Nitrogen 04/22/2023 15.6  8.9 - 20.6 mg/dL Final    Creatinine 04/22/2023 0.87  0.73 - 1.18 mg/dL Final    Calcium Level Total 04/22/2023 9.0  8.4 - 10.2 mg/dL Final    Protein Total 04/22/2023 7.1  6.4 - 8.3 gm/dL Final    Albumin Level 04/22/2023 3.9  3.5 - 5.0 g/dL Final    Globulin 04/22/2023 3.2  2.4 - 3.5 gm/dL Final    Albumin/Globulin Ratio 04/22/2023 1.2  1.1 - 2.0 ratio Final    Bilirubin Total 04/22/2023 0.9  <=1.5 mg/dL Final    Alkaline Phosphatase 04/22/2023 114  40 - 150 unit/L Final    Alanine Aminotransferase 04/22/2023 81 (H)  0 - 55 unit/L Final    Aspartate Aminotransferase 04/22/2023 66 (H)  5 - 34 unit/L Final    eGFR 04/22/2023 >60  mls/min/1.73/m2 Final    Cholesterol Total 04/22/2023 178  <=200 mg/dL Final    HDL Cholesterol 04/22/2023 49  35 - 60 mg/dL Final    Triglyceride 04/22/2023 101  34 - 140 mg/dL Final    Cholesterol/HDL Ratio 04/22/2023 4  0 - 5 Final    Very Low Density Lipoprotein 04/22/2023 20   Final    LDL Cholesterol 04/22/2023 109.00  50.00 - 140.00 mg/dL Final    Thyroid Stimulating Hormone 04/22/2023 2.392  0.350 - 4.940 uIU/mL Final    Hemoglobin A1c 04/22/2023 5.6  <=7.0 % Final    Estimated Average Glucose 04/22/2023 114.0  mg/dL Final    Hep C Ab Interp 04/22/2023 Nonreactive  Nonreactive Final    HIV 04/22/2023 Nonreactive  Nonreactive Final    WBC 04/22/2023 5.1  4.5 - 11.5 x10(3)/mcL Final    RBC 04/22/2023 4.54 (L)  4.70 - 6.10 x10(6)/mcL Final    Hgb 04/22/2023 12.6 (L)  14.0 - 18.0 g/dL Final    Hct 04/22/2023 39.2 (L)  42.0 - 52.0 % Final    MCV 04/22/2023 86.3  80.0  - 94.0 fL Final    MCH 04/22/2023 27.8  27.0 - 31.0 pg Final    MCHC 04/22/2023 32.1 (L)  33.0 - 36.0 g/dL Final    RDW 04/22/2023 13.1  11.5 - 17.0 % Final    Platelet 04/22/2023 180  130 - 400 x10(3)/mcL Final    MPV 04/22/2023 8.8  7.4 - 10.4 fL Final    Neut % 04/22/2023 45.4  % Final    Lymph % 04/22/2023 39.5  % Final    Mono % 04/22/2023 9.8  % Final    Eos % 04/22/2023 4.9  % Final    Basophil % 04/22/2023 0.4  % Final    Lymph # 04/22/2023 2.02  0.6 - 4.6 x10(3)/mcL Final    Neut # 04/22/2023 2.32  2.1 - 9.2 x10(3)/mcL Final    Mono # 04/22/2023 0.50  0.1 - 1.3 x10(3)/mcL Final    Eos # 04/22/2023 0.25  0 - 0.9 x10(3)/mcL Final    Baso # 04/22/2023 0.02  0 - 0.2 x10(3)/mcL Final    IG# 04/22/2023 0.00  0 - 0.04 x10(3)/mcL Final    IG% 04/22/2023 0.0  % Final    Iron Binding Capacity Unsaturated 04/22/2023 263 (H)  69 - 240 ug/dL Final    Iron Level 04/22/2023 82  65 - 175 ug/dL Final    Transferrin 04/22/2023 321  174 - 364 mg/dL Final    Iron Binding Capacity Total 04/22/2023 345  250 - 450 ug/dL Final    Iron Saturation 04/22/2023 24  20 - 50 % Final       Assessment and Plan       ICD-10-CM ICD-9-CM   1. Morbid obesity  E66.01 278.01   2. Anemia, unspecified type  D64.9 285.9        1. Morbid obesity  Overview:  04/25/2023 - 251 lb, BMI 38.29, start Wegovy and titrate up  07/27/2023 - 224 lb, BMI 34.09, increase Wegovy to maintenance dose of 2.4 mg weekly    Assessment & Plan:  Much improved, patient down 27 lb in 3 months.  Increase Wegovy to 2.4 mg weekly.  Follow-up 3 months.    Orders:  -     semaglutide, weight loss, 2.4 mg/0.75 mL PnIj; Inject 2.4 mg into the skin every 7 days.  Dispense: 3 mL; Refill: 5    2. Anemia, unspecified type  Assessment & Plan:  H&H 12.9 and 40.6.  Slightly improved from 3 months ago.  Patient was unable to complete stool for occult blood collection, encouraged to complete.             Follow up in about 3 months (around 10/27/2023) for follow up.

## 2023-07-27 NOTE — LETTER
AUTHORIZATION FOR RELEASE OF   CONFIDENTIAL INFORMATION    Dear Dr. Glover,    We are seeing Luz Larsen, date of birth 1985, in the clinic at [facility]. KRISTEL Borden is the patient's PCP. Luz Larsen has an outstanding lab/procedure at the time we reviewed his chart. In order to help keep his health information updated, he has authorized us to request the following medical record(s):        (  )  MAMMOGRAM                                      (  )  COLONOSCOPY      (  )  PAP SMEAR                                          (  )  OUTSIDE LAB RESULTS     (  )  DEXA SCAN                                          (  )  EYE EXAM            (  )  FOOT EXAM                                          (  )  ENTIRE RECORD     (  )  OUTSIDE IMMUNIZATIONS                 ( x ) MOST RECENT OFFICE NOTE         Please fax records to [facility], Kathryn F Duplantis, FNP, [phone]     If you have any questions, please contact [phone]           Patient Name: Luz Larsen  : 1985  Patient Phone #: [phone]

## 2023-07-31 PROBLEM — Z00.00 ANNUAL PHYSICAL EXAM: Status: RESOLVED | Noted: 2023-04-25 | Resolved: 2023-07-31

## 2023-08-12 PROCEDURE — 82270 OCCULT BLOOD FECES: CPT | Performed by: NURSE PRACTITIONER

## 2023-08-14 LAB
COLOR STL: NORMAL
CONSISTENCY STL: NORMAL
HEMOCCULT SP1 STL QL: NEGATIVE
HEMOCCULT SP2 STL QL: NEGATIVE
HEMOCCULT SP3 STL QL: NEGATIVE

## 2023-08-15 ENCOUNTER — TELEPHONE (OUTPATIENT)
Dept: FAMILY MEDICINE | Facility: CLINIC | Age: 38
End: 2023-08-15
Payer: COMMERCIAL

## 2023-08-15 NOTE — TELEPHONE ENCOUNTER
----- Message from KRISTEL Ch sent at 8/15/2023  7:47 AM CDT -----  Stool for occult blood all negative. Will repeat CBC with wellness in April

## 2023-10-23 ENCOUNTER — TELEPHONE (OUTPATIENT)
Dept: FAMILY MEDICINE | Facility: CLINIC | Age: 38
End: 2023-10-23
Payer: COMMERCIAL

## 2023-10-23 NOTE — TELEPHONE ENCOUNTER
No answer on 10/23/23 at 11:37.  Left message reminding patient about his upcoming visit. No labs needed prior to appointment.

## 2023-10-30 ENCOUNTER — OFFICE VISIT (OUTPATIENT)
Dept: FAMILY MEDICINE | Facility: CLINIC | Age: 38
End: 2023-10-30
Payer: COMMERCIAL

## 2023-10-30 VITALS
HEART RATE: 84 BPM | RESPIRATION RATE: 18 BRPM | DIASTOLIC BLOOD PRESSURE: 75 MMHG | BODY MASS INDEX: 34.4 KG/M2 | OXYGEN SATURATION: 98 % | WEIGHT: 227 LBS | SYSTOLIC BLOOD PRESSURE: 126 MMHG | TEMPERATURE: 98 F | HEIGHT: 68 IN

## 2023-10-30 DIAGNOSIS — D64.9 ANEMIA, UNSPECIFIED TYPE: ICD-10-CM

## 2023-10-30 DIAGNOSIS — E66.01 MORBID OBESITY: Primary | ICD-10-CM

## 2023-10-30 PROCEDURE — 1160F RVW MEDS BY RX/DR IN RCRD: CPT | Mod: CPTII,,, | Performed by: NURSE PRACTITIONER

## 2023-10-30 PROCEDURE — 1160F PR REVIEW ALL MEDS BY PRESCRIBER/CLIN PHARMACIST DOCUMENTED: ICD-10-PCS | Mod: CPTII,,, | Performed by: NURSE PRACTITIONER

## 2023-10-30 PROCEDURE — 99213 PR OFFICE/OUTPT VISIT, EST, LEVL III, 20-29 MIN: ICD-10-PCS | Mod: ,,, | Performed by: NURSE PRACTITIONER

## 2023-10-30 PROCEDURE — 3078F PR MOST RECENT DIASTOLIC BLOOD PRESSURE < 80 MM HG: ICD-10-PCS | Mod: CPTII,,, | Performed by: NURSE PRACTITIONER

## 2023-10-30 PROCEDURE — 1159F PR MEDICATION LIST DOCUMENTED IN MEDICAL RECORD: ICD-10-PCS | Mod: CPTII,,, | Performed by: NURSE PRACTITIONER

## 2023-10-30 PROCEDURE — 3008F PR BODY MASS INDEX (BMI) DOCUMENTED: ICD-10-PCS | Mod: CPTII,,, | Performed by: NURSE PRACTITIONER

## 2023-10-30 PROCEDURE — 3008F BODY MASS INDEX DOCD: CPT | Mod: CPTII,,, | Performed by: NURSE PRACTITIONER

## 2023-10-30 PROCEDURE — 3078F DIAST BP <80 MM HG: CPT | Mod: CPTII,,, | Performed by: NURSE PRACTITIONER

## 2023-10-30 PROCEDURE — 3044F PR MOST RECENT HEMOGLOBIN A1C LEVEL <7.0%: ICD-10-PCS | Mod: CPTII,,, | Performed by: NURSE PRACTITIONER

## 2023-10-30 PROCEDURE — 1159F MED LIST DOCD IN RCRD: CPT | Mod: CPTII,,, | Performed by: NURSE PRACTITIONER

## 2023-10-30 PROCEDURE — 3044F HG A1C LEVEL LT 7.0%: CPT | Mod: CPTII,,, | Performed by: NURSE PRACTITIONER

## 2023-10-30 PROCEDURE — 99213 OFFICE O/P EST LOW 20 MIN: CPT | Mod: ,,, | Performed by: NURSE PRACTITIONER

## 2023-10-30 PROCEDURE — 3074F SYST BP LT 130 MM HG: CPT | Mod: CPTII,,, | Performed by: NURSE PRACTITIONER

## 2023-10-30 PROCEDURE — 3074F PR MOST RECENT SYSTOLIC BLOOD PRESSURE < 130 MM HG: ICD-10-PCS | Mod: CPTII,,, | Performed by: NURSE PRACTITIONER

## 2023-10-30 NOTE — ASSESSMENT & PLAN NOTE
Patient was never able to fill the 2.4 mg dose of Wegovy, has been off of it since his last visit in July.  Has maintained his weight loss since being off the Wegovy.  Prescription of Wegovy sent to different pharmacy, [facility].  Patient given to sample pens of Ozempic to titrate up weekly prior to restarting Wegovy at the 2.4 mg dose.  Return to clinic in 3 months.

## 2023-10-30 NOTE — PROGRESS NOTES
Subjective:       Patient ID: Luz Larsen is a 38 y.o. male.    Chief Complaint: Obesity      HPI   This is a 38-year-old white male who presents to clinic today for three-month follow-up for morbid obesity.  Reports has not been able to get his refill of Wegovy since his last visit.  They did not have the dose that he needed.  Has been off of it now since August.  Appetite is starting to come back but he has really tried to maintain his healthier eating habits.  Review of Systems  Comprehensive review of systems negative except as stated in HPI    The patient's Health Maintenance was reviewed and the following appears to be due:   Health Maintenance Due   Topic Date Due    Influenza Vaccine (1) 09/01/2023    COVID-19 Vaccine (2 - 2023-24 season) 09/01/2023       Past Medical History:  Past Medical History:   Diagnosis Date    Elevated liver enzymes     GERD (gastroesophageal reflux disease)     Hyperlipidemia     Obstructive sleep apnea     Steatosis of liver      Past Surgical History:   Procedure Laterality Date    TONSILLECTOMY AND ADENOIDECTOMY       Review of patient's allergies indicates:   Allergen Reactions    Azithromycin Rash     Current Outpatient Medications on File Prior to Visit   Medication Sig Dispense Refill    coenzyme Q10 200 mg capsule Take 200 mg by mouth once daily.      MILK THISTLE ORAL Take 1,000 mg by mouth.      montelukast (SINGULAIR) 10 mg tablet TAKE 1 TABLET BY MOUTH EVERY DAY IN THE EVENING 90 tablet 3    pantoprazole (PROTONIX) 40 MG tablet TAKE 1 TABLET BY MOUTH EVERY DAY 90 tablet 3    rosuvastatin (CRESTOR) 20 MG tablet TAKE 1 TABLET BY MOUTH EVERY DAY 90 tablet 3    [DISCONTINUED] semaglutide, weight loss, 2.4 mg/0.75 mL PnIj Inject 2.4 mg into the skin every 7 days. (Patient not taking: Reported on 10/30/2023) 3 mL 5     No current facility-administered medications on file prior to visit.     Social History     Socioeconomic History    Marital status:    Tobacco Use     Smoking status: Never     Passive exposure: Past    Smokeless tobacco: Never   Substance and Sexual Activity    Alcohol use: Yes     Comment: seldom    Drug use: Never    Sexual activity: Yes     Partners: Female     Social Determinants of Health     Financial Resource Strain: Low Risk  (4/25/2023)    Overall Financial Resource Strain (CARDIA)     Difficulty of Paying Living Expenses: Not very hard   Food Insecurity: No Food Insecurity (4/25/2023)    Hunger Vital Sign     Worried About Running Out of Food in the Last Year: Never true     Ran Out of Food in the Last Year: Never true   Transportation Needs: No Transportation Needs (4/25/2023)    PRAPARE - Transportation     Lack of Transportation (Medical): No     Lack of Transportation (Non-Medical): No   Physical Activity: Insufficiently Active (4/25/2023)    Exercise Vital Sign     Days of Exercise per Week: 2 days     Minutes of Exercise per Session: 10 min   Stress: No Stress Concern Present (4/25/2023)    Citizen of the [place] [employer] - Occupational Stress Questionnaire     Feeling of Stress : Only a little   Social Connections: Moderately Isolated (4/25/2023)    Social Connection and Isolation Panel [NHANES]     Frequency of Communication with Friends and Family: More than three times a week     Frequency of Social Gatherings with Friends and Family: Once a week     Attends Church Services: Never     Active Member of Clubs or Organizations: No     Attends Club or Organization Meetings: Never     Marital Status:    Housing Stability: Low Risk  (4/25/2023)    Housing Stability Vital Sign     Unable to Pay for Housing in the Last Year: No     Number of Places Lived in the Last Year: 1     Unstable Housing in the Last Year: No     History reviewed. No pertinent family history.    Objective:       /75 (BP Location: Right arm, Patient Position: Sitting, BP Method: Large (Automatic))   Pulse 84   Temp 98.1 °F (36.7 °C) (Oral)   Resp 18    "Ht 5' 8" (1.727 m)   Wt 103 kg (227 lb)   SpO2 98%   BMI 34.52 kg/m²      Physical Exam  Vitals and nursing note reviewed.   Constitutional:       Appearance: Normal appearance. He is obese.   HENT:      Head: Normocephalic and atraumatic.   Eyes:      Extraocular Movements: Extraocular movements intact.      Conjunctiva/sclera: Conjunctivae normal.      Pupils: Pupils are equal, round, and reactive to light.   Cardiovascular:      Rate and Rhythm: Normal rate and regular rhythm.      Pulses: Normal pulses.      Heart sounds: Normal heart sounds.   Pulmonary:      Effort: Pulmonary effort is normal.      Breath sounds: Normal breath sounds.   Musculoskeletal:         General: Normal range of motion.      Cervical back: Normal range of motion and neck supple.   Skin:     General: Skin is warm and dry.   Neurological:      General: No focal deficit present.      Mental Status: He is alert and oriented to person, place, and time.   Psychiatric:         Mood and Affect: Mood normal.         Behavior: Behavior normal.         Thought Content: Thought content normal.         Judgment: Judgment normal.             Assessment and Plan       ICD-10-CM ICD-9-CM   1. Morbid obesity  E66.01 278.01   2. Anemia, unspecified type  D64.9 285.9        1. Morbid obesity  Overview:  04/25/2023 - 251 lb, BMI 38.29, start Wegovy and titrate up  07/27/2023 - 224 lb, BMI 34.09, increase Wegovy to maintenance dose of 2.4 mg weekly    Assessment & Plan:  Patient was never able to fill the 2.4 mg dose of Wegovy, has been off of it since his last visit in July.  Has maintained his weight loss since being off the Wegovy.  Prescription of Wegovy sent to different pharmacy, [facility].  Patient given to sample pens of Ozempic to titrate up weekly prior to restarting Wegovy at the 2.4 mg dose.  Return to clinic in 3 months.    Orders:  -     semaglutide, weight loss, 2.4 mg/0.75 mL PnIj; Inject 2.4 mg into the skin every 7 days.  Dispense: " 3 mL; Refill: 5    2. Anemia, unspecified type  Assessment & Plan:  Stool for occult blood negative in August, will repeat CBC in 6 months with wellness.             Follow up in about 3 months (around 1/30/2024) for follow up.

## 2023-11-27 ENCOUNTER — PATIENT MESSAGE (OUTPATIENT)
Dept: FAMILY MEDICINE | Facility: CLINIC | Age: 38
End: 2023-11-27
Payer: COMMERCIAL

## 2023-11-27 DIAGNOSIS — E66.01 MORBID OBESITY: ICD-10-CM

## 2024-01-24 ENCOUNTER — TELEPHONE (OUTPATIENT)
Dept: FAMILY MEDICINE | Facility: CLINIC | Age: 39
End: 2024-01-24
Payer: COMMERCIAL

## 2024-01-24 NOTE — TELEPHONE ENCOUNTER
----- Message from [facility] sent at 1/24/2024 10:31 AM CST -----  Regarding: Gypsy  .Type:  Needs Medical Advice    Who Called:  PT    Symptoms (please be specific):  no     How long has patient had these symptoms:   no    Pharmacy name and phone #:   no    Would the patient rather a call back or a response via [facility]?      Best Call Back Number:  [phone]    Additional Information:  pt wants to speak with Gypsy

## 2024-01-24 NOTE — TELEPHONE ENCOUNTER
Patient called requesting to change his visit to telemed due to having to leave for work out of town.  He was instructed to get a current weight and blood pressure reading.  Patient verbalized understanding.

## 2024-01-31 ENCOUNTER — OFFICE VISIT (OUTPATIENT)
Dept: FAMILY MEDICINE | Facility: CLINIC | Age: 39
End: 2024-01-31
Payer: COMMERCIAL

## 2024-01-31 VITALS
HEIGHT: 68 IN | DIASTOLIC BLOOD PRESSURE: 76 MMHG | BODY MASS INDEX: 32.58 KG/M2 | WEIGHT: 215 LBS | SYSTOLIC BLOOD PRESSURE: 122 MMHG

## 2024-01-31 DIAGNOSIS — E66.01 MORBID OBESITY: Primary | ICD-10-CM

## 2024-01-31 DIAGNOSIS — Z00.00 ANNUAL PHYSICAL EXAM: ICD-10-CM

## 2024-01-31 DIAGNOSIS — D64.9 ANEMIA, UNSPECIFIED TYPE: ICD-10-CM

## 2024-01-31 PROCEDURE — 3078F DIAST BP <80 MM HG: CPT | Mod: CPTII,95,, | Performed by: NURSE PRACTITIONER

## 2024-01-31 PROCEDURE — 1160F RVW MEDS BY RX/DR IN RCRD: CPT | Mod: CPTII,95,, | Performed by: NURSE PRACTITIONER

## 2024-01-31 PROCEDURE — 99213 OFFICE O/P EST LOW 20 MIN: CPT | Mod: 95,,, | Performed by: NURSE PRACTITIONER

## 2024-01-31 PROCEDURE — 1159F MED LIST DOCD IN RCRD: CPT | Mod: CPTII,95,, | Performed by: NURSE PRACTITIONER

## 2024-01-31 PROCEDURE — 3074F SYST BP LT 130 MM HG: CPT | Mod: CPTII,95,, | Performed by: NURSE PRACTITIONER

## 2024-01-31 PROCEDURE — 3008F BODY MASS INDEX DOCD: CPT | Mod: CPTII,95,, | Performed by: NURSE PRACTITIONER

## 2024-01-31 NOTE — PROGRESS NOTES
TELEMEDICINE VISIT     Patient ID: Luz Larsen is a 39 y.o. male.  MRN: [id number]  : 1985    Subjective:        TELEMEDICINE  The patient location is: home  The chief complaint leading to consultation is: Obesity (3 month f/u)     Visit type: Virtual visit with synchronous audio and video    Total time spent with patient: 15 minutes  20 minutes of total time spent on the encounter, which includes face to face time and non-face to face time preparing to see the patient (eg, review of tests), obtaining and/or reviewing separately obtained history, documenting clinical information in the electronic or other health record, independently interpreting results (not separately reported) and communicating results to the patient/family/caregiver, or care coordination (not separately reported).    Each patient to whom he or she provides medical services by telemedicine is:  (1) informed of the relationship between the physician and patient and the respective role of any other health care provider with respect to management of the patient; and (2) notified that he or she may decline to receive medical services by telemedicine and may withdraw from such care at any time.    HPI: HPI   This is a 39-year-old white male who was seen today via virtual visit for three-month follow-up for morbid obesity.  States doing well with the Wegovy and denies any side effects.  No complaints today.    Health maintenance reviewed with the patient.  Health maintenance completed:  Health Maintenance Topics with due status: Not Due       Topic Last Completion Date    Hemoglobin A1c (Diabetic Prevention Screening) 2023    Lipid Panel 2023      Health maintenance due:  Health Maintenance Due   Topic Date Due    Influenza Vaccine (1) 2023    COVID-19 Vaccine (2 - - season) 2023      ROS:  Review of Systems   Constitutional:  Negative for activity change and unexpected weight change.   HENT:  Negative for hearing  "loss, rhinorrhea and trouble swallowing.    Eyes:  Negative for discharge and visual disturbance.   Respiratory:  Negative for chest tightness and wheezing.    Cardiovascular:  Negative for chest pain and palpitations.   Gastrointestinal:  Negative for blood in stool, constipation, diarrhea and vomiting.   Endocrine: Negative for polydipsia and polyuria.   Genitourinary:  Negative for difficulty urinating, hematuria and urgency.   Musculoskeletal:  Negative for arthralgias, joint swelling and neck pain.   Neurological:  Negative for weakness and headaches.   Psychiatric/Behavioral:  Negative for confusion and dysphoric mood.       Complete ROS negative except as stated in HPI  History:     Past Medical History:   Diagnosis Date    Elevated liver enzymes     GERD (gastroesophageal reflux disease)     Hyperlipidemia     Obstructive sleep apnea     Steatosis of liver       Past Surgical History:   Procedure Laterality Date    TONSILLECTOMY  10/20/1998    TONSILLECTOMY AND ADENOIDECTOMY       History reviewed. No pertinent family history.   Social History     Tobacco Use    Smoking status: Never     Passive exposure: Past    Smokeless tobacco: Never   Substance and Sexual Activity    Alcohol use: Not Currently     Comment: seldom    Drug use: Never    Sexual activity: Yes     Partners: Female     Birth control/protection: None          Allergies:   Review of patient's allergies indicates:   Allergen Reactions    Azithromycin Rash     Objective:     Vitals:    01/31/24 1506   BP: 122/76   Weight: 97.5 kg (215 lb)   Height: 5' 8" (1.727 m)   PainSc: 0-No pain         Physical Examination:   Physical Exam  Constitutional:       Appearance: Normal appearance.   HENT:      Head: Normocephalic and atraumatic.   Neurological:      General: No focal deficit present.      Mental Status: He is alert and oriented to person, place, and time.   Psychiatric:         Mood and Affect: Mood normal.         Behavior: Behavior normal.    "      Thought Content: Thought content normal.         Judgment: Judgment normal.           Medications:     Medication List with Changes/Refills   Current Medications    COENZYME Q10 200 MG CAPSULE    Take 200 mg by mouth once daily.    MILK THISTLE ORAL    Take 1,000 mg by mouth.    MONTELUKAST (SINGULAIR) 10 MG TABLET    TAKE 1 TABLET BY MOUTH EVERY DAY IN THE EVENING    PANTOPRAZOLE (PROTONIX) 40 MG TABLET    TAKE 1 TABLET BY MOUTH EVERY DAY    ROSUVASTATIN (CRESTOR) 20 MG TABLET    TAKE 1 TABLET BY MOUTH EVERY DAY    SEMAGLUTIDE, WEIGHT LOSS, 2.4 MG/0.75 ML PNIJ    Inject 2.4 mg into the skin every 7 days.     Assessment and Plan       ICD-10-CM ICD-9-CM   1. Morbid obesity  E66.01 278.01   2. Anemia, unspecified type  D64.9 285.9   3. Annual physical exam  Z00.00 V70.0     1. Morbid obesity  Overview:  04/25/2023 - 251 lb, BMI 38.29, start Wegovy and titrate up  07/27/2023 - 224 lb, BMI 34.09, increase Wegovy to maintenance dose of 2.4 mg weekly  02/01/2024 - 215 lb, BMI 32.69, continue maintenance dose will go be 2.4 mg weekly    Assessment & Plan:  Still improving, BMI down to 32.69, continue Wegovy 2.4 mg weekly, follow-up 3 months with wellness.      2. Anemia, unspecified type  Assessment & Plan:  Iron panel in 3 months.    Orders:  -     Iron and TIBC; Future; Expected date: 05/01/2024    3. Annual physical exam  Overview:  Annual exam yearly in April    Orders:  -     CBC Auto Differential; Future; Expected date: 05/01/2024  -     Comprehensive Metabolic Panel; Future; Expected date: 05/01/2024  -     Lipid Panel; Future; Expected date: 05/01/2024  -     TSH; Future; Expected date: 05/01/2024  -     Hemoglobin A1C; Future; Expected date: 05/01/2024              Follow Up:   Follow up in 3 months (on 4/30/2024) for Annual.    I spent greater than 20 minutes today both in chart review and greater than 50% of that time in discussion with the patient regarding health maintenance, diagnoses, diagnostic tests,  medications, treatments, symptom management, expected results and adverse effects. Patient verbalized understanding and all questions were answered.        Answers submitted by the patient for this visit:  Review of Systems Questionnaire (Submitted on 1/31/2024)  activity change: No  unexpected weight change: No  neck pain: No  hearing loss: No  rhinorrhea: No  trouble swallowing: No  eye discharge: No  visual disturbance: No  chest tightness: No  wheezing: No  chest pain: No  palpitations: No  blood in stool: No  constipation: No  vomiting: No  diarrhea: No  polydipsia: No  polyuria: No  difficulty urinating: No  urgency: No  hematuria: No  joint swelling: No  arthralgias: No  headaches: No  weakness: No  confusion: No  dysphoric mood: No

## 2024-02-01 NOTE — ASSESSMENT & PLAN NOTE
Still improving, BMI down to 32.69, continue Wegovy 2.4 mg weekly, follow-up 3 months with wellness.

## 2024-04-23 ENCOUNTER — TELEPHONE (OUTPATIENT)
Dept: FAMILY MEDICINE | Facility: CLINIC | Age: 39
End: 2024-04-23
Payer: COMMERCIAL

## 2024-04-23 NOTE — TELEPHONE ENCOUNTER
Attempted to contact patient for previsit on 4/23/24 at 9:50.  Left message reminding patient about his upcoming visit with labs needed prior to appointment.

## 2024-04-25 ENCOUNTER — PATIENT MESSAGE (OUTPATIENT)
Dept: FAMILY MEDICINE | Facility: CLINIC | Age: 39
End: 2024-04-25
Payer: COMMERCIAL

## 2024-04-29 ENCOUNTER — LAB VISIT (OUTPATIENT)
Dept: LAB | Facility: HOSPITAL | Age: 39
End: 2024-04-29
Attending: NURSE PRACTITIONER
Payer: COMMERCIAL

## 2024-04-29 DIAGNOSIS — D64.9 ANEMIA, UNSPECIFIED TYPE: ICD-10-CM

## 2024-04-29 DIAGNOSIS — Z00.00 ANNUAL PHYSICAL EXAM: ICD-10-CM

## 2024-04-29 LAB
ALBUMIN SERPL-MCNC: 3.6 G/DL (ref 3.5–5)
ALBUMIN/GLOB SERPL: 1 RATIO (ref 1.1–2)
ALP SERPL-CCNC: 108 UNIT/L (ref 40–150)
ALT SERPL-CCNC: 43 UNIT/L (ref 0–55)
AST SERPL-CCNC: 45 UNIT/L (ref 5–34)
BASOPHILS # BLD AUTO: 0.02 X10(3)/MCL
BASOPHILS NFR BLD AUTO: 0.3 %
BILIRUB SERPL-MCNC: 0.6 MG/DL
BUN SERPL-MCNC: 17.7 MG/DL (ref 8.9–20.6)
CALCIUM SERPL-MCNC: 9.4 MG/DL (ref 8.4–10.2)
CHLORIDE SERPL-SCNC: 109 MMOL/L (ref 98–107)
CHOLEST SERPL-MCNC: 153 MG/DL
CHOLEST/HDLC SERPL: 4 {RATIO} (ref 0–5)
CO2 SERPL-SCNC: 24 MMOL/L (ref 22–29)
CREAT SERPL-MCNC: 0.86 MG/DL (ref 0.73–1.18)
EOSINOPHIL # BLD AUTO: 0.24 X10(3)/MCL (ref 0–0.9)
EOSINOPHIL NFR BLD AUTO: 4.1 %
ERYTHROCYTE [DISTWIDTH] IN BLOOD BY AUTOMATED COUNT: 16.5 % (ref 11.5–17)
EST. AVERAGE GLUCOSE BLD GHB EST-MCNC: 105.4 MG/DL
GFR SERPLBLD CREATININE-BSD FMLA CKD-EPI: >60 MLS/MIN/1.73/M2
GLOBULIN SER-MCNC: 3.7 GM/DL (ref 2.4–3.5)
GLUCOSE SERPL-MCNC: 96 MG/DL (ref 74–100)
HBA1C MFR BLD: 5.3 %
HCT VFR BLD AUTO: 39.2 % (ref 42–52)
HDLC SERPL-MCNC: 43 MG/DL (ref 35–60)
HGB BLD-MCNC: 12.6 G/DL (ref 14–18)
IMM GRANULOCYTES # BLD AUTO: 0.01 X10(3)/MCL (ref 0–0.04)
IMM GRANULOCYTES NFR BLD AUTO: 0.2 %
IRON SATN MFR SERPL: 11 % (ref 20–50)
IRON SERPL-MCNC: 37 UG/DL (ref 65–175)
LDLC SERPL CALC-MCNC: 55 MG/DL (ref 50–140)
LYMPHOCYTES # BLD AUTO: 1.94 X10(3)/MCL (ref 0.6–4.6)
LYMPHOCYTES NFR BLD AUTO: 33.2 %
MCH RBC QN AUTO: 28.6 PG (ref 27–31)
MCHC RBC AUTO-ENTMCNC: 32.1 G/DL (ref 33–36)
MCV RBC AUTO: 88.9 FL (ref 80–94)
MONOCYTES # BLD AUTO: 0.61 X10(3)/MCL (ref 0.1–1.3)
MONOCYTES NFR BLD AUTO: 10.4 %
NEUTROPHILS # BLD AUTO: 3.03 X10(3)/MCL (ref 2.1–9.2)
NEUTROPHILS NFR BLD AUTO: 51.8 %
PLATELET # BLD AUTO: 183 X10(3)/MCL (ref 130–400)
PMV BLD AUTO: 9.9 FL (ref 7.4–10.4)
POTASSIUM SERPL-SCNC: 3.8 MMOL/L (ref 3.5–5.1)
PROT SERPL-MCNC: 7.3 GM/DL (ref 6.4–8.3)
RBC # BLD AUTO: 4.41 X10(6)/MCL (ref 4.7–6.1)
SODIUM SERPL-SCNC: 141 MMOL/L (ref 136–145)
TIBC SERPL-MCNC: 306 UG/DL (ref 69–240)
TIBC SERPL-MCNC: 343 UG/DL (ref 250–450)
TRANSFERRIN SERPL-MCNC: 309 MG/DL (ref 174–364)
TRIGL SERPL-MCNC: 273 MG/DL (ref 34–140)
TSH SERPL-ACNC: 3.66 UIU/ML (ref 0.35–4.94)
VLDLC SERPL CALC-MCNC: 55 MG/DL
WBC # SPEC AUTO: 5.85 X10(3)/MCL (ref 4.5–11.5)

## 2024-04-29 PROCEDURE — 85025 COMPLETE CBC W/AUTO DIFF WBC: CPT

## 2024-04-29 PROCEDURE — 83540 ASSAY OF IRON: CPT

## 2024-04-29 PROCEDURE — 80053 COMPREHEN METABOLIC PANEL: CPT

## 2024-04-29 PROCEDURE — 84443 ASSAY THYROID STIM HORMONE: CPT

## 2024-04-29 PROCEDURE — 83036 HEMOGLOBIN GLYCOSYLATED A1C: CPT

## 2024-04-29 PROCEDURE — 80061 LIPID PANEL: CPT

## 2024-04-29 PROCEDURE — 36415 COLL VENOUS BLD VENIPUNCTURE: CPT

## 2024-04-30 ENCOUNTER — OFFICE VISIT (OUTPATIENT)
Dept: FAMILY MEDICINE | Facility: CLINIC | Age: 39
End: 2024-04-30
Payer: COMMERCIAL

## 2024-04-30 VITALS
TEMPERATURE: 98 F | SYSTOLIC BLOOD PRESSURE: 122 MMHG | DIASTOLIC BLOOD PRESSURE: 76 MMHG | HEART RATE: 75 BPM | RESPIRATION RATE: 16 BRPM | OXYGEN SATURATION: 98 % | HEIGHT: 68 IN | WEIGHT: 224 LBS | BODY MASS INDEX: 33.95 KG/M2

## 2024-04-30 DIAGNOSIS — E78.2 MIXED HYPERLIPIDEMIA: ICD-10-CM

## 2024-04-30 DIAGNOSIS — D50.9 IRON DEFICIENCY ANEMIA, UNSPECIFIED IRON DEFICIENCY ANEMIA TYPE: ICD-10-CM

## 2024-04-30 DIAGNOSIS — Z00.00 ANNUAL PHYSICAL EXAM: Primary | ICD-10-CM

## 2024-04-30 DIAGNOSIS — E66.01 MORBID OBESITY: ICD-10-CM

## 2024-04-30 DIAGNOSIS — G47.33 OBSTRUCTIVE SLEEP APNEA SYNDROME: ICD-10-CM

## 2024-04-30 DIAGNOSIS — Z23 NEED FOR DIPHTHERIA-TETANUS-PERTUSSIS (TDAP) VACCINE: ICD-10-CM

## 2024-04-30 PROCEDURE — 3044F HG A1C LEVEL LT 7.0%: CPT | Mod: CPTII,,, | Performed by: NURSE PRACTITIONER

## 2024-04-30 PROCEDURE — 1160F RVW MEDS BY RX/DR IN RCRD: CPT | Mod: CPTII,,, | Performed by: NURSE PRACTITIONER

## 2024-04-30 PROCEDURE — 3078F DIAST BP <80 MM HG: CPT | Mod: CPTII,,, | Performed by: NURSE PRACTITIONER

## 2024-04-30 PROCEDURE — 3008F BODY MASS INDEX DOCD: CPT | Mod: CPTII,,, | Performed by: NURSE PRACTITIONER

## 2024-04-30 PROCEDURE — 1159F MED LIST DOCD IN RCRD: CPT | Mod: CPTII,,, | Performed by: NURSE PRACTITIONER

## 2024-04-30 PROCEDURE — 99395 PREV VISIT EST AGE 18-39: CPT | Mod: 25,,, | Performed by: NURSE PRACTITIONER

## 2024-04-30 PROCEDURE — 90715 TDAP VACCINE 7 YRS/> IM: CPT | Mod: ,,, | Performed by: NURSE PRACTITIONER

## 2024-04-30 PROCEDURE — 3074F SYST BP LT 130 MM HG: CPT | Mod: CPTII,,, | Performed by: NURSE PRACTITIONER

## 2024-04-30 PROCEDURE — 90471 IMMUNIZATION ADMIN: CPT | Mod: ,,, | Performed by: NURSE PRACTITIONER

## 2024-04-30 RX ORDER — IRON,CARBONYL/ASCORBIC ACID 100-250 MG
1 TABLET ORAL DAILY
Qty: 30 TABLET | Refills: 11 | Status: SHIPPED | OUTPATIENT
Start: 2024-04-30

## 2024-04-30 NOTE — ASSESSMENT & PLAN NOTE
Encouraged patient to increase his exercise and maintain a calorie deficit diet.  Continue Wegovy 2.4 mg weekly, follow-up 6 months.

## 2024-04-30 NOTE — PROGRESS NOTES
Subjective:       Patient ID: Luz Larsen is a 39 y.o. male.    Chief Complaint: Annual Exam      HPI   This is a 39-year-old white male who presents to clinic today for an annual wellness exam.  Patient reports overall he is doing well.  No complaints today.  Review of Systems  Comprehensive review of systems negative except as stated in HPI    The patient's Health Maintenance was reviewed and the following appears to be due:   There are no preventive care reminders to display for this patient.    Past Medical History:  Past Medical History:   Diagnosis Date    Elevated liver enzymes     GERD (gastroesophageal reflux disease)     Hyperlipidemia     Obstructive sleep apnea     Steatosis of liver      Past Surgical History:   Procedure Laterality Date    TONSILLECTOMY  10/20/1998    TONSILLECTOMY AND ADENOIDECTOMY       Review of patient's allergies indicates:   Allergen Reactions    Azithromycin Rash     Current Outpatient Medications on File Prior to Visit   Medication Sig Dispense Refill    coenzyme Q10 200 mg capsule Take 200 mg by mouth once daily.      MILK THISTLE ORAL Take 1,000 mg by mouth.      montelukast (SINGULAIR) 10 mg tablet TAKE 1 TABLET BY MOUTH EVERY DAY IN THE EVENING 90 tablet 3    pantoprazole (PROTONIX) 40 MG tablet TAKE 1 TABLET BY MOUTH EVERY DAY 90 tablet 3    rosuvastatin (CRESTOR) 20 MG tablet TAKE 1 TABLET BY MOUTH EVERY DAY 90 tablet 3    semaglutide, weight loss, 2.4 mg/0.75 mL PnIj Inject 2.4 mg into the skin every 7 days. 3 mL 5     No current facility-administered medications on file prior to visit.     Social History     Socioeconomic History    Marital status:    Tobacco Use    Smoking status: Never     Passive exposure: Past    Smokeless tobacco: Never   Substance and Sexual Activity    Alcohol use: Not Currently     Comment: seldom    Drug use: Never    Sexual activity: Yes     Partners: Female     Birth control/protection: None     Social Determinants of Health  "    Financial Resource Strain: Patient Declined (1/31/2024)    Overall Financial Resource Strain (CARDIA)     Difficulty of Paying Living Expenses: Patient declined   Food Insecurity: Patient Declined (1/31/2024)    Hunger Vital Sign     Worried About Running Out of Food in the Last Year: Patient declined     Ran Out of Food in the Last Year: Patient declined   Transportation Needs: Patient Declined (1/31/2024)    PRAPARE - Transportation     Lack of Transportation (Medical): Patient declined     Lack of Transportation (Non-Medical): Patient declined   Physical Activity: Insufficiently Active (1/31/2024)    Exercise Vital Sign     Days of Exercise per Week: 3 days     Minutes of Exercise per Session: 10 min   Stress: No Stress Concern Present (1/31/2024)    [employer] - Occupational Stress Questionnaire     Feeling of Stress : Only a little   Housing Stability: Patient Declined (1/31/2024)    Housing Stability Vital Sign     Unable to Pay for Housing in the Last Year: Patient declined     Number of Places Lived in the Last Year: 1     Unstable Housing in the Last Year: Patient declined     No family history on file.    Objective:       /76 (BP Location: Left arm)   Pulse 75   Temp 98.2 °F (36.8 °C) (Oral)   Resp 16   Ht 5' 8" (1.727 m)   Wt 101.6 kg (224 lb)   SpO2 98%   BMI 34.06 kg/m²      Physical Exam  Vitals and nursing note reviewed.   Constitutional:       Appearance: Normal appearance. He is obese.   HENT:      Head: Normocephalic and atraumatic.      Right Ear: Tympanic membrane, ear canal and external ear normal.      Left Ear: Tympanic membrane, ear canal and external ear normal.      Nose: Nose normal.      Mouth/Throat:      Mouth: Mucous membranes are moist.      Pharynx: Oropharynx is clear.   Eyes:      Extraocular Movements: Extraocular movements intact.      Conjunctiva/sclera: Conjunctivae normal.      Pupils: Pupils are equal, round, and reactive to " light.   Cardiovascular:      Rate and Rhythm: Normal rate and regular rhythm.      Pulses: Normal pulses.      Heart sounds: Normal heart sounds.   Pulmonary:      Effort: Pulmonary effort is normal.      Breath sounds: Normal breath sounds.   Abdominal:      General: Abdomen is flat. Bowel sounds are normal.      Palpations: Abdomen is soft.   Musculoskeletal:         General: Normal range of motion.      Cervical back: Normal range of motion and neck supple.   Skin:     General: Skin is warm and dry.   Neurological:      General: No focal deficit present.      Mental Status: He is alert and oriented to person, place, and time.   Psychiatric:         Mood and Affect: Mood normal.         Behavior: Behavior normal.         Thought Content: Thought content normal.         Judgment: Judgment normal.         Labs  Lab Visit on 04/29/2024   Component Date Value Ref Range Status    Sodium Level 04/29/2024 141  136 - 145 mmol/L Final    Potassium Level 04/29/2024 3.8  3.5 - 5.1 mmol/L Final    Chloride 04/29/2024 109 (H)  98 - 107 mmol/L Final    Carbon Dioxide 04/29/2024 24  22 - 29 mmol/L Final    Glucose Level 04/29/2024 96  74 - 100 mg/dL Final    Blood Urea Nitrogen 04/29/2024 17.7  8.9 - 20.6 mg/dL Final    Creatinine 04/29/2024 0.86  0.73 - 1.18 mg/dL Final    Calcium Level Total 04/29/2024 9.4  8.4 - 10.2 mg/dL Final    Protein Total 04/29/2024 7.3  6.4 - 8.3 gm/dL Final    Albumin Level 04/29/2024 3.6  3.5 - 5.0 g/dL Final    Globulin 04/29/2024 3.7 (H)  2.4 - 3.5 gm/dL Final    Albumin/Globulin Ratio 04/29/2024 1.0 (L)  1.1 - 2.0 ratio Final    Bilirubin Total 04/29/2024 0.6  <=1.5 mg/dL Final    Alkaline Phosphatase 04/29/2024 108  40 - 150 unit/L Final    Alanine Aminotransferase 04/29/2024 43  0 - 55 unit/L Final    Aspartate Aminotransferase 04/29/2024 45 (H)  5 - 34 unit/L Final    eGFR 04/29/2024 >60  mls/min/1.73/m2 Final    Cholesterol Total 04/29/2024 153  <=200 mg/dL Final    HDL Cholesterol  04/29/2024 43  35 - 60 mg/dL Final    Triglyceride 04/29/2024 273 (H)  34 - 140 mg/dL Final    Cholesterol/HDL Ratio 04/29/2024 4  0 - 5 Final    Very Low Density Lipoprotein 04/29/2024 55   Final    LDL Cholesterol 04/29/2024 55.00  50.00 - 140.00 mg/dL Final    TSH 04/29/2024 3.663  0.350 - 4.940 uIU/mL Final    Hemoglobin A1c 04/29/2024 5.3  <=7.0 % Final    Estimated Average Glucose 04/29/2024 105.4  mg/dL Final    Iron Binding Capacity Unsaturated 04/29/2024 306 (H)  69 - 240 ug/dL Final    Iron Level 04/29/2024 37 (L)  65 - 175 ug/dL Final    Transferrin 04/29/2024 309  174 - 364 mg/dL Final    Iron Binding Capacity Total 04/29/2024 343  250 - 450 ug/dL Final    Iron Saturation 04/29/2024 11 (L)  20 - 50 % Final    WBC 04/29/2024 5.85  4.50 - 11.50 x10(3)/mcL Final    RBC 04/29/2024 4.41 (L)  4.70 - 6.10 x10(6)/mcL Final    Hgb 04/29/2024 12.6 (L)  14.0 - 18.0 g/dL Final    Hct 04/29/2024 39.2 (L)  42.0 - 52.0 % Final    MCV 04/29/2024 88.9  80.0 - 94.0 fL Final    MCH 04/29/2024 28.6  27.0 - 31.0 pg Final    MCHC 04/29/2024 32.1 (L)  33.0 - 36.0 g/dL Final    RDW 04/29/2024 16.5  11.5 - 17.0 % Final    Platelet 04/29/2024 183  130 - 400 x10(3)/mcL Final    MPV 04/29/2024 9.9  7.4 - 10.4 fL Final    Neut % 04/29/2024 51.8  % Final    Lymph % 04/29/2024 33.2  % Final    Mono % 04/29/2024 10.4  % Final    Eos % 04/29/2024 4.1  % Final    Basophil % 04/29/2024 0.3  % Final    Lymph # 04/29/2024 1.94  0.6 - 4.6 x10(3)/mcL Final    Neut # 04/29/2024 3.03  2.1 - 9.2 x10(3)/mcL Final    Mono # 04/29/2024 0.61  0.1 - 1.3 x10(3)/mcL Final    Eos # 04/29/2024 0.24  0 - 0.9 x10(3)/mcL Final    Baso # 04/29/2024 0.02  <=0.2 x10(3)/mcL Final    IG# 04/29/2024 0.01  0 - 0.04 x10(3)/mcL Final    IG% 04/29/2024 0.2  % Final       Assessment and Plan       ICD-10-CM ICD-9-CM   1. Annual physical exam  Z00.00 V70.0   2. Mixed hyperlipidemia  E78.2 272.2   3. Iron deficiency anemia, unspecified iron deficiency anemia type  D50.9  280.9   4. Morbid obesity  E66.01 278.01   5. Obstructive sleep apnea syndrome  G47.33 327.23   6. Need for diphtheria-tetanus-pertussis (Tdap) vaccine  Z23 V06.1        1. Annual physical exam  Overview:  Annual exam yearly in April      2. Mixed hyperlipidemia  Overview:  Rosuvastatin 20 mg daily    Assessment & Plan:  Stable, total cholesterol 153, LDL 55.  Continue rosuvastatin 20 mg daily, follow-up 1 year.      3. Iron deficiency anemia, unspecified iron deficiency anemia type  Overview:  04/18/22 16:24  RBC: 4.49  Hemoglobin: 13.7  Hematocrit: 42.0    04/22/23 10:17  RBC: 4.54 (L)  Hemoglobin: 12.6 (L)  Hematocrit: 39.2 (L)  Iron: 82  TIBC: 345  Iron Binding Capacity Unsaturated: 263 (H)  Transferrin: 321  Iron Saturation: 24    07/25/23 06:48  RBC: 4.51 (L)  Hemoglobin: 12.9 (L)  Hematocrit: 40.6 (L)    08/12/2023, 08/13/2023, 08/14/2023 - Stool OB negative    04/29/24 08:05  RBC: 4.41 (L)  Hemoglobin: 12.6 (L)  Hematocrit: 39.2 (L)  Iron: 37 (L)  TIBC: 343  Iron Binding Capacity Unsaturated: 306 (H)  Transferrin: 309  Iron Saturation: 11 (L)          Assessment & Plan:  Refer to hematology for further workup.    Orders:  -     Cancel: Ambulatory referral/consult to Hematology / Oncology; Future; Expected date: 05/07/2024  -     iron-vitamin C 100-250 mg, ICAR-C, (ICAR-C) 100-250 mg Tab; Take 1 tablet by mouth Daily.  Dispense: 30 tablet; Refill: 11  -     Ambulatory referral/consult to Hematology / Oncology; Future; Expected date: 05/07/2024    4. Morbid obesity  Overview:  04/25/2023 - 251 lb, BMI 38.29, start Wegovy and titrate up  07/27/2023 - 224 lb, BMI 34.09, increase Wegovy to maintenance dose of 2.4 mg weekly  02/01/2024 - 215 lb, BMI 32.69, continue maintenance dose Wegovy 2.4 mg weekly  04/30/2024 - 224 lb, BMI 34.06    Assessment & Plan:  Encouraged patient to increase his exercise and maintain a calorie deficit diet.  Continue Wegovy 2.4 mg weekly, follow-up 6 months.      5. Obstructive sleep  apnea syndrome  Overview:  Home sleep study with [facility] 04/20/2021  AHI 28.1  Auto Pap 5-20 cm H20     Assessment & Plan:  Stable, reports nightly compliance with auto PAP therapy.  Reports relief of excessive daytime sleepiness with auto PAP.  Patient is using and benefitting from nightly auto PAP therapy, recommend continued nightly auto PAP at 5-20 cm H2O.  Follow-up 1 year.      6. Need for diphtheria-tetanus-pertussis (Tdap) vaccine  -     Tdap (BOOSTRIX) vaccine injection 0.5 mL           Follow up in about 6 months (around 10/30/2024) for follow up.

## 2024-04-30 NOTE — ASSESSMENT & PLAN NOTE
Stable, reports nightly compliance with auto PAP therapy.  Reports relief of excessive daytime sleepiness with auto PAP.  Patient is using and benefitting from nightly auto PAP therapy, recommend continued nightly auto PAP at 5-20 cm H2O.  Follow-up 1 year.

## 2024-05-02 ENCOUNTER — PATIENT MESSAGE (OUTPATIENT)
Dept: FAMILY MEDICINE | Facility: CLINIC | Age: 39
End: 2024-05-02
Payer: COMMERCIAL

## 2024-05-02 DIAGNOSIS — B00.1 HERPES LABIALIS: Primary | ICD-10-CM

## 2024-05-02 RX ORDER — VALACYCLOVIR HYDROCHLORIDE 1 G/1
2000 TABLET, FILM COATED ORAL EVERY 12 HOURS
Qty: 30 TABLET | Refills: 11 | Status: SHIPPED | OUTPATIENT
Start: 2024-05-02

## 2024-05-06 PROBLEM — Z00.00 ANNUAL PHYSICAL EXAM: Status: RESOLVED | Noted: 2023-04-25 | Resolved: 2024-05-06

## 2024-05-14 NOTE — PROGRESS NOTES
Referring physician: KRISTEL Narvaez  Reason for referral: Iron Deficiency Anemia      Subjective:       Patient ID: Luz Larsen is a 39 y.o. male.    Iron Deficiency Anemia--Diagnosed 4/2024    Work-up:  8/14/23--Stool for occult blood negative X 3.  4/29/24--iron 37, TIBC 306, iron saturation 11%.    Current treatment: Oral iron daily started 4/2024    Chief Complaint: Other Misc (NPH)    HPI  40 yo male, has developed a mild anemia in the last 1 year, iron saturation low. Patient gives a h/o heartburn and was started on Protonix and that completely resolved. Denies any BRBPR or melena. No FH of anemia or colon cancer. Patient denies any fatigue or other complaints. He was started on oral iron 3 weeks ago with no issues, aside from stools now dark.     Past Medical History:   Diagnosis Date    Elevated liver enzymes     GERD (gastroesophageal reflux disease)     Hyperlipidemia     Obstructive sleep apnea     Steatosis of liver       Past Surgical History:   Procedure Laterality Date    TONSILLECTOMY  10/20/1998    TONSILLECTOMY AND ADENOIDECTOMY       No family history on file.  Social History     Socioeconomic History    Marital status:    Tobacco Use    Smoking status: Never     Passive exposure: Past    Smokeless tobacco: Never   Substance and Sexual Activity    Alcohol use: Not Currently     Comment: seldom    Drug use: Never    Sexual activity: Yes     Partners: Female     Birth control/protection: None     Social Determinants of Health     Financial Resource Strain: Patient Declined (1/31/2024)    Overall Financial Resource Strain (CARDIA)     Difficulty of Paying Living Expenses: Patient declined   Food Insecurity: Patient Declined (1/31/2024)    Hunger Vital Sign     Worried About Running Out of Food in the Last Year: Patient declined     Ran Out of Food in the Last Year: Patient declined   Transportation Needs: Patient Declined (1/31/2024)    PRAPARE - Transportation     Lack of  Transportation (Medical): Patient declined     Lack of Transportation (Non-Medical): Patient declined   Physical Activity: Insufficiently Active (1/31/2024)    Exercise Vital Sign     Days of Exercise per Week: 3 days     Minutes of Exercise per Session: 10 min   Stress: No Stress Concern Present (1/31/2024)    [employer] - Occupational Stress Questionnaire     Feeling of Stress : Only a little   Housing Stability: Patient Declined (1/31/2024)    Housing Stability Vital Sign     Unable to Pay for Housing in the Last Year: Patient declined     Number of Places Lived in the Last Year: 1     Unstable Housing in the Last Year: Patient declined       Review of patient's allergies indicates:   Allergen Reactions    Azithromycin Rash      Current Outpatient Medications on File Prior to Visit   Medication Sig Dispense Refill    coenzyme Q10 200 mg capsule Take 200 mg by mouth once daily.      iron-vitamin C 100-250 mg, ICAR-C, (ICAR-C) 100-250 mg Tab Take 1 tablet by mouth Daily. 30 tablet 11    MILK THISTLE ORAL Take 1,000 mg by mouth.      montelukast (SINGULAIR) 10 mg tablet TAKE 1 TABLET BY MOUTH EVERY DAY IN THE EVENING 90 tablet 3    pantoprazole (PROTONIX) 40 MG tablet TAKE 1 TABLET BY MOUTH EVERY DAY 90 tablet 3    rosuvastatin (CRESTOR) 20 MG tablet TAKE 1 TABLET BY MOUTH EVERY DAY 90 tablet 3    semaglutide, weight loss, 2.4 mg/0.75 mL PnIj Inject 2.4 mg into the skin every 7 days. 3 mL 5    valACYclovir (VALTREX) 1000 MG tablet Take 2 tablets (2,000 mg total) by mouth every 12 (twelve) hours. 30 tablet 11     No current facility-administered medications on file prior to visit.      Review of Systems   Constitutional:  Negative for appetite change and unexpected weight change.   HENT:  Negative for mouth sores.    Eyes:  Negative for visual disturbance.   Respiratory:  Negative for cough and shortness of breath.    Cardiovascular:  Negative for chest pain.   Gastrointestinal:   "Negative for abdominal pain and diarrhea.   Genitourinary:  Negative for frequency.   Musculoskeletal:  Negative for back pain.   Integumentary:  Negative for rash.   Neurological:  Negative for headaches.   Hematological:  Negative for adenopathy.   Psychiatric/Behavioral:  The patient is not nervous/anxious.               Vitals:    05/20/24 1317   BP: 121/79   Pulse: 82   Resp: 14   Temp: 98.2 °F (36.8 °C)   TempSrc: Oral   SpO2: 97%   Weight: 101 kg (222 lb 11.2 oz)   Height: 5' 8" (1.727 m)      Physical Exam  Constitutional:       General: He is awake.      Appearance: Normal appearance.   HENT:      Head: Normocephalic and atraumatic.      Nose: Nose normal.      Mouth/Throat:      Mouth: Mucous membranes are moist.   Eyes:      General: Vision grossly intact.      Extraocular Movements: Extraocular movements intact.      Conjunctiva/sclera: Conjunctivae normal.   Cardiovascular:      Rate and Rhythm: Normal rate and regular rhythm.      Heart sounds: Normal heart sounds.   Pulmonary:      Effort: Pulmonary effort is normal.      Breath sounds: Normal breath sounds.   Abdominal:      General: Bowel sounds are normal. There is no distension.      Palpations: Abdomen is soft.      Tenderness: There is no abdominal tenderness.   Musculoskeletal:      Cervical back: Normal range of motion and neck supple.      Right lower leg: No edema.      Left lower leg: No edema.   Lymphadenopathy:      Cervical: No cervical adenopathy.      Upper Body:      Right upper body: No supraclavicular adenopathy.      Left upper body: No supraclavicular adenopathy.   Skin:     General: Skin is warm.   Neurological:      Mental Status: He is alert and oriented to person, place, and time.      Motor: Motor function is intact.   Psychiatric:         Mood and Affect: Mood normal.         Speech: Speech normal.         Behavior: Behavior is cooperative.         Judgment: Judgment normal.       Lab Visit on 04/29/2024   Component Date " Value Ref Range Status    Sodium 04/29/2024 141  136 - 145 mmol/L Final    Potassium 04/29/2024 3.8  3.5 - 5.1 mmol/L Final    Chloride 04/29/2024 109 (H)  98 - 107 mmol/L Final    CO2 04/29/2024 24  22 - 29 mmol/L Final    Glucose 04/29/2024 96  74 - 100 mg/dL Final    Blood Urea Nitrogen 04/29/2024 17.7  8.9 - 20.6 mg/dL Final    Creatinine 04/29/2024 0.86  0.73 - 1.18 mg/dL Final    Calcium 04/29/2024 9.4  8.4 - 10.2 mg/dL Final    Protein Total 04/29/2024 7.3  6.4 - 8.3 gm/dL Final    Albumin 04/29/2024 3.6  3.5 - 5.0 g/dL Final    Globulin 04/29/2024 3.7 (H)  2.4 - 3.5 gm/dL Final    Albumin/Globulin Ratio 04/29/2024 1.0 (L)  1.1 - 2.0 ratio Final    Bilirubin Total 04/29/2024 0.6  <=1.5 mg/dL Final    ALP 04/29/2024 108  40 - 150 unit/L Final    ALT 04/29/2024 43  0 - 55 unit/L Final    AST 04/29/2024 45 (H)  5 - 34 unit/L Final    eGFR 04/29/2024 >60  mls/min/1.73/m2 Final    Cholesterol Total 04/29/2024 153  <=200 mg/dL Final    HDL Cholesterol 04/29/2024 43  35 - 60 mg/dL Final    Triglyceride 04/29/2024 273 (H)  34 - 140 mg/dL Final    Cholesterol/HDL Ratio 04/29/2024 4  0 - 5 Final    Very Low Density Lipoprotein 04/29/2024 55   Final    LDL Cholesterol 04/29/2024 55.00  50.00 - 140.00 mg/dL Final    TSH 04/29/2024 3.663  0.350 - 4.940 uIU/mL Final    Hemoglobin A1c 04/29/2024 5.3  <=7.0 % Final    Estimated Average Glucose 04/29/2024 105.4  mg/dL Final    Iron Binding Capacity Unsaturated 04/29/2024 306 (H)  69 - 240 ug/dL Final    Iron Level 04/29/2024 37 (L)  65 - 175 ug/dL Final    Transferrin 04/29/2024 309  174 - 364 mg/dL Final    Iron Binding Capacity Total 04/29/2024 343  250 - 450 ug/dL Final    Iron Saturation 04/29/2024 11 (L)  20 - 50 % Final    WBC 04/29/2024 5.85  4.50 - 11.50 x10(3)/mcL Final    RBC 04/29/2024 4.41 (L)  4.70 - 6.10 x10(6)/mcL Final    Hgb 04/29/2024 12.6 (L)  14.0 - 18.0 g/dL Final    Hct 04/29/2024 39.2 (L)  42.0 - 52.0 % Final    MCV 04/29/2024 88.9  80.0 - 94.0 fL Final     MCH 04/29/2024 28.6  27.0 - 31.0 pg Final    MCHC 04/29/2024 32.1 (L)  33.0 - 36.0 g/dL Final    RDW 04/29/2024 16.5  11.5 - 17.0 % Final    Platelet 04/29/2024 183  130 - 400 x10(3)/mcL Final    MPV 04/29/2024 9.9  7.4 - 10.4 fL Final    Neut % 04/29/2024 51.8  % Final    Lymph % 04/29/2024 33.2  % Final    Mono % 04/29/2024 10.4  % Final    Eos % 04/29/2024 4.1  % Final    Basophil % 04/29/2024 0.3  % Final    Lymph # 04/29/2024 1.94  0.6 - 4.6 x10(3)/mcL Final    Neut # 04/29/2024 3.03  2.1 - 9.2 x10(3)/mcL Final    Mono # 04/29/2024 0.61  0.1 - 1.3 x10(3)/mcL Final    Eos # 04/29/2024 0.24  0 - 0.9 x10(3)/mcL Final    Baso # 04/29/2024 0.02  <=0.2 x10(3)/mcL Final    IG# 04/29/2024 0.01  0 - 0.04 x10(3)/mcL Final    IG% 04/29/2024 0.2  % Final         Assessment:       1. Iron deficiency anemia due to chronic blood loss    2. Iron deficiency anemia, unspecified iron deficiency anemia type         Plan:       Patient with SANTOS, unexplained.  Had stool for occult blood checked in 2023 negative X 3.     Refer to GI for EGD/colonoscopy given that we don't have any reason for the iron deficiency.  Also may need work up for celiac disease.    Suspect it could be malabsorption due to long-term PPI usage which may need to be addressed if no other etiology found.    Continue oral iron.    F/u in 3 months with repeat labs.    All questions answered at this time.      Katharine Webb MD

## 2024-05-20 ENCOUNTER — OFFICE VISIT (OUTPATIENT)
Dept: HEMATOLOGY/ONCOLOGY | Facility: CLINIC | Age: 39
End: 2024-05-20
Payer: COMMERCIAL

## 2024-05-20 VITALS
SYSTOLIC BLOOD PRESSURE: 121 MMHG | WEIGHT: 222.69 LBS | BODY MASS INDEX: 33.75 KG/M2 | RESPIRATION RATE: 14 BRPM | TEMPERATURE: 98 F | HEIGHT: 68 IN | HEART RATE: 82 BPM | OXYGEN SATURATION: 97 % | DIASTOLIC BLOOD PRESSURE: 79 MMHG

## 2024-05-20 DIAGNOSIS — D50.9 IRON DEFICIENCY ANEMIA, UNSPECIFIED IRON DEFICIENCY ANEMIA TYPE: ICD-10-CM

## 2024-05-20 DIAGNOSIS — D50.0 IRON DEFICIENCY ANEMIA DUE TO CHRONIC BLOOD LOSS: Primary | ICD-10-CM

## 2024-05-20 LAB
BASOPHILS # BLD AUTO: 0.03 X10(3)/MCL
BASOPHILS NFR BLD AUTO: 0.5 %
EOSINOPHIL # BLD AUTO: 0.18 X10(3)/MCL (ref 0–0.9)
EOSINOPHIL NFR BLD AUTO: 2.9 %
ERYTHROCYTE [DISTWIDTH] IN BLOOD BY AUTOMATED COUNT: 15.6 % (ref 11.5–17)
FERRITIN SERPL-MCNC: 84.69 NG/ML (ref 21.81–274.66)
HCT VFR BLD AUTO: 40.8 % (ref 42–52)
HGB BLD-MCNC: 13.5 G/DL (ref 14–18)
IMM GRANULOCYTES # BLD AUTO: 0.01 X10(3)/MCL (ref 0–0.04)
IMM GRANULOCYTES NFR BLD AUTO: 0.2 %
LYMPHOCYTES # BLD AUTO: 1.85 X10(3)/MCL (ref 0.6–4.6)
LYMPHOCYTES NFR BLD AUTO: 30.1 %
MCH RBC QN AUTO: 29.5 PG (ref 27–31)
MCHC RBC AUTO-ENTMCNC: 33.1 G/DL (ref 33–36)
MCV RBC AUTO: 89.1 FL (ref 80–94)
MONOCYTES # BLD AUTO: 0.52 X10(3)/MCL (ref 0.1–1.3)
MONOCYTES NFR BLD AUTO: 8.5 %
NEUTROPHILS # BLD AUTO: 3.55 X10(3)/MCL (ref 2.1–9.2)
NEUTROPHILS NFR BLD AUTO: 57.8 %
PLATELET # BLD AUTO: 183 X10(3)/MCL (ref 130–400)
PMV BLD AUTO: 8.9 FL (ref 7.4–10.4)
RBC # BLD AUTO: 4.58 X10(6)/MCL (ref 4.7–6.1)
WBC # SPEC AUTO: 6.14 X10(3)/MCL (ref 4.5–11.5)

## 2024-05-20 PROCEDURE — 36415 COLL VENOUS BLD VENIPUNCTURE: CPT | Performed by: INTERNAL MEDICINE

## 2024-05-20 PROCEDURE — 3074F SYST BP LT 130 MM HG: CPT | Mod: CPTII,S$GLB,, | Performed by: INTERNAL MEDICINE

## 2024-05-20 PROCEDURE — 3044F HG A1C LEVEL LT 7.0%: CPT | Mod: CPTII,S$GLB,, | Performed by: INTERNAL MEDICINE

## 2024-05-20 PROCEDURE — 99204 OFFICE O/P NEW MOD 45 MIN: CPT | Mod: S$GLB,,, | Performed by: INTERNAL MEDICINE

## 2024-05-20 PROCEDURE — 1160F RVW MEDS BY RX/DR IN RCRD: CPT | Mod: CPTII,S$GLB,, | Performed by: INTERNAL MEDICINE

## 2024-05-20 PROCEDURE — 3078F DIAST BP <80 MM HG: CPT | Mod: CPTII,S$GLB,, | Performed by: INTERNAL MEDICINE

## 2024-05-20 PROCEDURE — 1159F MED LIST DOCD IN RCRD: CPT | Mod: CPTII,S$GLB,, | Performed by: INTERNAL MEDICINE

## 2024-05-20 PROCEDURE — 85025 COMPLETE CBC W/AUTO DIFF WBC: CPT | Performed by: INTERNAL MEDICINE

## 2024-05-20 PROCEDURE — 99999 PR PBB SHADOW E&M-EST. PATIENT-LVL V: CPT | Mod: PBBFAC,,, | Performed by: INTERNAL MEDICINE

## 2024-05-20 PROCEDURE — 82728 ASSAY OF FERRITIN: CPT | Performed by: INTERNAL MEDICINE

## 2024-05-20 PROCEDURE — 3008F BODY MASS INDEX DOCD: CPT | Mod: CPTII,S$GLB,, | Performed by: INTERNAL MEDICINE

## 2024-05-29 DIAGNOSIS — E66.01 MORBID OBESITY: ICD-10-CM

## 2024-05-29 RX ORDER — SEMAGLUTIDE 2.4 MG/.75ML
2.4 INJECTION, SOLUTION SUBCUTANEOUS
Qty: 3 EACH | Refills: 5 | Status: SHIPPED | OUTPATIENT
Start: 2024-05-29

## 2024-06-07 ENCOUNTER — PATIENT MESSAGE (OUTPATIENT)
Dept: HEMATOLOGY/ONCOLOGY | Facility: CLINIC | Age: 39
End: 2024-06-07
Payer: COMMERCIAL

## 2024-07-05 DIAGNOSIS — E78.5 HYPERLIPIDEMIA, UNSPECIFIED HYPERLIPIDEMIA TYPE: ICD-10-CM

## 2024-07-05 DIAGNOSIS — K21.9 GASTROESOPHAGEAL REFLUX DISEASE, UNSPECIFIED WHETHER ESOPHAGITIS PRESENT: ICD-10-CM

## 2024-07-05 DIAGNOSIS — J30.2 SEASONAL ALLERGIES: ICD-10-CM

## 2024-07-05 RX ORDER — PANTOPRAZOLE SODIUM 40 MG/1
TABLET, DELAYED RELEASE ORAL
Qty: 90 TABLET | Refills: 3 | Status: SHIPPED | OUTPATIENT
Start: 2024-07-05

## 2024-07-05 RX ORDER — MONTELUKAST SODIUM 10 MG/1
TABLET ORAL
Qty: 90 TABLET | Refills: 3 | Status: SHIPPED | OUTPATIENT
Start: 2024-07-05

## 2024-07-05 RX ORDER — ROSUVASTATIN CALCIUM 20 MG/1
TABLET, COATED ORAL
Qty: 90 TABLET | Refills: 3 | Status: SHIPPED | OUTPATIENT
Start: 2024-07-05

## 2024-07-18 ENCOUNTER — DOCUMENTATION ONLY (OUTPATIENT)
Dept: FAMILY MEDICINE | Facility: CLINIC | Age: 39
End: 2024-07-18
Payer: COMMERCIAL

## 2024-07-18 LAB — CRC RECOMMENDATION EXT: NORMAL

## 2024-08-16 ENCOUNTER — LAB VISIT (OUTPATIENT)
Dept: LAB | Facility: HOSPITAL | Age: 39
End: 2024-08-16
Attending: INTERNAL MEDICINE
Payer: COMMERCIAL

## 2024-08-16 DIAGNOSIS — D50.0 IRON DEFICIENCY ANEMIA DUE TO CHRONIC BLOOD LOSS: ICD-10-CM

## 2024-08-16 DIAGNOSIS — D50.9 IRON DEFICIENCY ANEMIA, UNSPECIFIED IRON DEFICIENCY ANEMIA TYPE: ICD-10-CM

## 2024-08-16 LAB
BASOPHILS # BLD AUTO: 0.03 X10(3)/MCL
BASOPHILS NFR BLD AUTO: 0.6 %
EOSINOPHIL # BLD AUTO: 0.22 X10(3)/MCL (ref 0–0.9)
EOSINOPHIL NFR BLD AUTO: 4 %
ERYTHROCYTE [DISTWIDTH] IN BLOOD BY AUTOMATED COUNT: 13.1 % (ref 11.5–17)
FERRITIN SERPL-MCNC: 73.8 NG/ML (ref 21.81–274.66)
HCT VFR BLD AUTO: 40.1 % (ref 42–52)
HGB BLD-MCNC: 13.5 G/DL (ref 14–18)
IMM GRANULOCYTES # BLD AUTO: 0.01 X10(3)/MCL (ref 0–0.04)
IMM GRANULOCYTES NFR BLD AUTO: 0.2 %
IRON SATN MFR SERPL: 34 % (ref 20–50)
IRON SERPL-MCNC: 102 UG/DL (ref 65–175)
LYMPHOCYTES # BLD AUTO: 1.91 X10(3)/MCL (ref 0.6–4.6)
LYMPHOCYTES NFR BLD AUTO: 35 %
MCH RBC QN AUTO: 31.8 PG (ref 27–31)
MCHC RBC AUTO-ENTMCNC: 33.7 G/DL (ref 33–36)
MCV RBC AUTO: 94.6 FL (ref 80–94)
MONOCYTES # BLD AUTO: 0.55 X10(3)/MCL (ref 0.1–1.3)
MONOCYTES NFR BLD AUTO: 10.1 %
NEUTROPHILS # BLD AUTO: 2.73 X10(3)/MCL (ref 2.1–9.2)
NEUTROPHILS NFR BLD AUTO: 50.1 %
PLATELET # BLD AUTO: 160 X10(3)/MCL (ref 130–400)
PMV BLD AUTO: 9.1 FL (ref 7.4–10.4)
RBC # BLD AUTO: 4.24 X10(6)/MCL (ref 4.7–6.1)
TIBC SERPL-MCNC: 198 UG/DL (ref 69–240)
TIBC SERPL-MCNC: 300 UG/DL (ref 250–450)
TRANSFERRIN SERPL-MCNC: 260 MG/DL (ref 174–364)
WBC # BLD AUTO: 5.45 X10(3)/MCL (ref 4.5–11.5)

## 2024-08-16 PROCEDURE — 85025 COMPLETE CBC W/AUTO DIFF WBC: CPT

## 2024-08-16 PROCEDURE — 36415 COLL VENOUS BLD VENIPUNCTURE: CPT

## 2024-08-16 PROCEDURE — 82728 ASSAY OF FERRITIN: CPT

## 2024-08-16 PROCEDURE — 83550 IRON BINDING TEST: CPT

## 2024-08-16 PROCEDURE — 83540 ASSAY OF IRON: CPT

## 2024-08-19 ENCOUNTER — OFFICE VISIT (OUTPATIENT)
Dept: HEMATOLOGY/ONCOLOGY | Facility: CLINIC | Age: 39
End: 2024-08-19
Payer: COMMERCIAL

## 2024-08-19 VITALS
TEMPERATURE: 98 F | OXYGEN SATURATION: 95 % | SYSTOLIC BLOOD PRESSURE: 108 MMHG | WEIGHT: 217.38 LBS | DIASTOLIC BLOOD PRESSURE: 70 MMHG | HEART RATE: 93 BPM | BODY MASS INDEX: 32.94 KG/M2 | RESPIRATION RATE: 14 BRPM | HEIGHT: 68 IN

## 2024-08-19 DIAGNOSIS — D50.0 IRON DEFICIENCY ANEMIA DUE TO CHRONIC BLOOD LOSS: Primary | ICD-10-CM

## 2024-08-19 DIAGNOSIS — E66.01 MORBID OBESITY: ICD-10-CM

## 2024-08-19 PROCEDURE — 1160F RVW MEDS BY RX/DR IN RCRD: CPT | Mod: CPTII,S$GLB,, | Performed by: NURSE PRACTITIONER

## 2024-08-19 PROCEDURE — 99213 OFFICE O/P EST LOW 20 MIN: CPT | Mod: S$GLB,,, | Performed by: NURSE PRACTITIONER

## 2024-08-19 PROCEDURE — 3044F HG A1C LEVEL LT 7.0%: CPT | Mod: CPTII,S$GLB,, | Performed by: NURSE PRACTITIONER

## 2024-08-19 PROCEDURE — 1159F MED LIST DOCD IN RCRD: CPT | Mod: CPTII,S$GLB,, | Performed by: NURSE PRACTITIONER

## 2024-08-19 PROCEDURE — 3078F DIAST BP <80 MM HG: CPT | Mod: CPTII,S$GLB,, | Performed by: NURSE PRACTITIONER

## 2024-08-19 PROCEDURE — 99999 PR PBB SHADOW E&M-EST. PATIENT-LVL IV: CPT | Mod: PBBFAC,,, | Performed by: NURSE PRACTITIONER

## 2024-08-19 PROCEDURE — 3074F SYST BP LT 130 MM HG: CPT | Mod: CPTII,S$GLB,, | Performed by: NURSE PRACTITIONER

## 2024-08-19 PROCEDURE — 3008F BODY MASS INDEX DOCD: CPT | Mod: CPTII,S$GLB,, | Performed by: NURSE PRACTITIONER

## 2024-08-19 NOTE — PROGRESS NOTES
Referring physician: KRISTEL Narvaez  Reason for referral: Iron Deficiency Anemia      Subjective:       Patient ID: Luz Larsen is a 39 y.o. male.    Iron Deficiency Anemia--Diagnosed 4/2024    Work-up:  8/14/23--Stool for occult blood negative X 3.  4/29/24--iron 37, TIBC 306, iron saturation 11%.    S/p colonoscopy on 7/18/24 with Dr. Santana--normal mucosa in the terminal ileum, polyp in the rectum, grade 1/stage I internal hemorrhoids.     Current treatment: Oral iron daily started 4/2024    Chief Complaint: Other Misc (Pt reports no concern today.)    HPI  The patient presents today for scheduled follow-up for his SANTOS. He was started on oral iron in April. He underwent colonoscopy last month that did not reveal any bleeding. EGD was offered but patient has declined due to cost. He is tolerating the oral iron well with only mild constipation. Reports stools are dark but otherwise tolerating. He continues on Protonix daily. No other problems reported.     Past Medical History:   Diagnosis Date    Elevated liver enzymes     GERD (gastroesophageal reflux disease)     Hyperlipidemia     Obstructive sleep apnea     Steatosis of liver       Past Surgical History:   Procedure Laterality Date    TONSILLECTOMY  10/20/1998    TONSILLECTOMY AND ADENOIDECTOMY       No family history on file.  Social History     Socioeconomic History    Marital status:    Tobacco Use    Smoking status: Never     Passive exposure: Past    Smokeless tobacco: Never   Substance and Sexual Activity    Alcohol use: Not Currently     Comment: seldom    Drug use: Never    Sexual activity: Yes     Partners: Female     Birth control/protection: None     Social Determinants of Health     Financial Resource Strain: Patient Declined (1/31/2024)    Overall Financial Resource Strain (CARDIA)     Difficulty of Paying Living Expenses: Patient declined   Food Insecurity: Patient Declined (1/31/2024)    Hunger Vital Sign     Worried About  Running Out of Food in the Last Year: Patient declined     Ran Out of Food in the Last Year: Patient declined   Transportation Needs: Patient Declined (1/31/2024)    PRAPARE - Transportation     Lack of Transportation (Medical): Patient declined     Lack of Transportation (Non-Medical): Patient declined   Physical Activity: Insufficiently Active (1/31/2024)    Exercise Vital Sign     Days of Exercise per Week: 3 days     Minutes of Exercise per Session: 10 min   Stress: No Stress Concern Present (1/31/2024)    [employer] [place] [employer] - Occupational Stress Questionnaire     Feeling of Stress : Only a little   Housing Stability: Patient Declined (1/31/2024)    Housing Stability Vital Sign     Unable to Pay for Housing in the Last Year: Patient declined     Number of Places Lived in the Last Year: 1     Unstable Housing in the Last Year: Patient declined       Review of patient's allergies indicates:   Allergen Reactions    Azithromycin Rash      Current Outpatient Medications on File Prior to Visit   Medication Sig Dispense Refill    coenzyme Q10 200 mg capsule Take 200 mg by mouth once daily.      iron-vitamin C 100-250 mg, ICAR-C, (ICAR-C) 100-250 mg Tab Take 1 tablet by mouth Daily. 30 tablet 11    MILK THISTLE ORAL Take 1,000 mg by mouth.      montelukast (SINGULAIR) 10 mg tablet TAKE 1 TABLET BY MOUTH EVERY DAY IN THE EVENING 90 tablet 3    pantoprazole (PROTONIX) 40 MG tablet TAKE 1 TABLET BY MOUTH EVERY DAY 90 tablet 3    rosuvastatin (CRESTOR) 20 MG tablet TAKE 1 TABLET BY MOUTH EVERY DAY 90 tablet 3    valACYclovir (VALTREX) 1000 MG tablet Take 2 tablets (2,000 mg total) by mouth every 12 (twelve) hours. 30 tablet 11    WEGOVY 2.4 mg/0.75 mL PnIj INJECT 2.4 MG INTO THE SKIN EVERY 7 DAYS. 3 each 5     No current facility-administered medications on file prior to visit.      Review of Systems   Constitutional:  Negative for appetite change and unexpected weight change.   HENT:  Negative for  "mouth sores.    Eyes:  Negative for visual disturbance.   Respiratory:  Negative for cough and shortness of breath.    Cardiovascular:  Negative for chest pain.   Gastrointestinal:  Negative for abdominal pain and diarrhea.   Genitourinary:  Negative for frequency.   Musculoskeletal:  Negative for back pain.   Integumentary:  Negative for rash.   Neurological:  Negative for headaches.   Hematological:  Negative for adenopathy.   Psychiatric/Behavioral:  The patient is not nervous/anxious.         Vitals:    08/19/24 1433   BP: 108/70   Pulse: 93   Resp: 14   Temp: 98.4 °F (36.9 °C)   TempSrc: Oral   SpO2: 95%   Weight: 98.6 kg (217 lb 6.4 oz)   Height: 5' 8" (1.727 m)     Physical Exam  Constitutional:       General: He is awake.      Appearance: Normal appearance.   HENT:      Head: Normocephalic and atraumatic.      Nose: Nose normal.      Mouth/Throat:      Mouth: Mucous membranes are moist.   Eyes:      General: Vision grossly intact.      Extraocular Movements: Extraocular movements intact.      Conjunctiva/sclera: Conjunctivae normal.   Cardiovascular:      Rate and Rhythm: Normal rate and regular rhythm.      Heart sounds: Normal heart sounds.   Pulmonary:      Effort: Pulmonary effort is normal.      Breath sounds: Normal breath sounds.   Abdominal:      General: Bowel sounds are normal. There is no distension.      Palpations: Abdomen is soft.      Tenderness: There is no abdominal tenderness.   Musculoskeletal:      Cervical back: Normal range of motion and neck supple.      Right lower leg: No edema.      Left lower leg: No edema.   Lymphadenopathy:      Cervical: No cervical adenopathy.      Upper Body:      Right upper body: No supraclavicular adenopathy.      Left upper body: No supraclavicular adenopathy.   Skin:     General: Skin is warm.   Neurological:      Mental Status: He is alert and oriented to person, place, and time.      Motor: Motor function is intact.   Psychiatric:         Mood and " Affect: Mood normal.         Speech: Speech normal.         Behavior: Behavior is cooperative.         Judgment: Judgment normal.       Lab Visit on 08/16/2024   Component Date Value Ref Range Status    Ferritin Level 08/16/2024 73.80  21.81 - 274.66 ng/mL Final    Iron Binding Capacity Unsaturated 08/16/2024 198  69 - 240 ug/dL Final    Iron Level 08/16/2024 102  65 - 175 ug/dL Final    Transferrin 08/16/2024 260  174 - 364 mg/dL Final    Iron Binding Capacity Total 08/16/2024 300  250 - 450 ug/dL Final    Iron Saturation 08/16/2024 34  20 - 50 % Final    WBC 08/16/2024 5.45  4.50 - 11.50 x10(3)/mcL Final    RBC 08/16/2024 4.24 (L)  4.70 - 6.10 x10(6)/mcL Final    Hgb 08/16/2024 13.5 (L)  14.0 - 18.0 g/dL Final    Hct 08/16/2024 40.1 (L)  42.0 - 52.0 % Final    MCV 08/16/2024 94.6 (H)  80.0 - 94.0 fL Final    MCH 08/16/2024 31.8 (H)  27.0 - 31.0 pg Final    MCHC 08/16/2024 33.7  33.0 - 36.0 g/dL Final    RDW 08/16/2024 13.1  11.5 - 17.0 % Final    Platelet 08/16/2024 160  130 - 400 x10(3)/mcL Final    MPV 08/16/2024 9.1  7.4 - 10.4 fL Final    Neut % 08/16/2024 50.1  % Final    Lymph % 08/16/2024 35.0  % Final    Mono % 08/16/2024 10.1  % Final    Eos % 08/16/2024 4.0  % Final    Basophil % 08/16/2024 0.6  % Final    Lymph # 08/16/2024 1.91  0.6 - 4.6 x10(3)/mcL Final    Neut # 08/16/2024 2.73  2.1 - 9.2 x10(3)/mcL Final    Mono # 08/16/2024 0.55  0.1 - 1.3 x10(3)/mcL Final    Eos # 08/16/2024 0.22  0 - 0.9 x10(3)/mcL Final    Baso # 08/16/2024 0.03  <=0.2 x10(3)/mcL Final    IG# 08/16/2024 0.01  0 - 0.04 x10(3)/mcL Final    IG% 08/16/2024 0.2  % Final         Assessment:       1. Iron deficiency anemia due to chronic blood loss    2. Morbid obesity         Plan:       Patient with SANTOS, unexplained.  Had stool for occult blood checked in 2023 negative X 3.     Referred to GI for EGD/colonoscopy given that we don't have any reason for the iron deficiency.  S/p colonoscopy on 7/18/24 with Dr. Santana--normal mucosa in  the terminal ileum, polyp in the rectum, grade 1/stage I internal hemorrhoids. Plan for EGD in the near future.   Patient has declined EGD at this time due to financial reasons.   Also may need work up for celiac disease.    Recent labs show improved anemia with Hgb of 13.5 g/dL. Iron level 102 with ferritin 73.80. MCV and MCH now elevated.   Started oral iron in April 2024.   Suspect anemia could be malabsorption due to long-term PPI usage which may need to be addressed if no other etiology found.  Continue oral iron.  He has an upcoming appointment with his PCP in November. She will likely repeat labs prior. Will schedule a follow-up with labs here in 6 months.     All questions answered at this time.      Susannah Woodall, APRIL

## 2024-08-23 ENCOUNTER — PATIENT MESSAGE (OUTPATIENT)
Dept: FAMILY MEDICINE | Facility: CLINIC | Age: 39
End: 2024-08-23
Payer: COMMERCIAL

## 2024-09-04 ENCOUNTER — PATIENT MESSAGE (OUTPATIENT)
Dept: FAMILY MEDICINE | Facility: CLINIC | Age: 39
End: 2024-09-04
Payer: COMMERCIAL

## 2024-10-28 ENCOUNTER — PATIENT MESSAGE (OUTPATIENT)
Dept: FAMILY MEDICINE | Facility: CLINIC | Age: 39
End: 2024-10-28
Payer: COMMERCIAL

## 2024-11-13 ENCOUNTER — PATIENT MESSAGE (OUTPATIENT)
Dept: FAMILY MEDICINE | Facility: CLINIC | Age: 39
End: 2024-11-13
Payer: COMMERCIAL

## 2024-11-20 ENCOUNTER — OFFICE VISIT (OUTPATIENT)
Dept: FAMILY MEDICINE | Facility: CLINIC | Age: 39
End: 2024-11-20
Payer: COMMERCIAL

## 2024-11-20 VITALS
WEIGHT: 220 LBS | BODY MASS INDEX: 33.34 KG/M2 | HEART RATE: 83 BPM | SYSTOLIC BLOOD PRESSURE: 130 MMHG | OXYGEN SATURATION: 98 % | TEMPERATURE: 98 F | RESPIRATION RATE: 16 BRPM | DIASTOLIC BLOOD PRESSURE: 78 MMHG | HEIGHT: 68 IN

## 2024-11-20 DIAGNOSIS — D50.9 IRON DEFICIENCY ANEMIA, UNSPECIFIED IRON DEFICIENCY ANEMIA TYPE: ICD-10-CM

## 2024-11-20 DIAGNOSIS — B00.1 HERPES LABIALIS: ICD-10-CM

## 2024-11-20 DIAGNOSIS — J30.2 SEASONAL ALLERGIES: ICD-10-CM

## 2024-11-20 DIAGNOSIS — E66.01 MORBID OBESITY: ICD-10-CM

## 2024-11-20 DIAGNOSIS — K21.9 GASTROESOPHAGEAL REFLUX DISEASE, UNSPECIFIED WHETHER ESOPHAGITIS PRESENT: ICD-10-CM

## 2024-11-20 DIAGNOSIS — D50.9 IRON DEFICIENCY ANEMIA, UNSPECIFIED IRON DEFICIENCY ANEMIA TYPE: Primary | ICD-10-CM

## 2024-11-20 DIAGNOSIS — E78.5 HYPERLIPIDEMIA, UNSPECIFIED HYPERLIPIDEMIA TYPE: ICD-10-CM

## 2024-11-20 LAB
BASOPHILS # BLD AUTO: 0.03 X10(3)/MCL
BASOPHILS NFR BLD AUTO: 0.5 %
EOSINOPHIL # BLD AUTO: 0.23 X10(3)/MCL (ref 0–0.9)
EOSINOPHIL NFR BLD AUTO: 4.2 %
ERYTHROCYTE [DISTWIDTH] IN BLOOD BY AUTOMATED COUNT: 11.9 % (ref 11.5–17)
FERRITIN SERPL-MCNC: 111.4 NG/ML (ref 21.81–274.66)
FOLATE SERPL-MCNC: 6.9 NG/ML (ref 7–31.4)
HCT VFR BLD AUTO: 42.2 % (ref 42–52)
HGB BLD-MCNC: 14.2 G/DL (ref 14–18)
IMM GRANULOCYTES # BLD AUTO: 0.01 X10(3)/MCL (ref 0–0.04)
IMM GRANULOCYTES NFR BLD AUTO: 0.2 %
IRON SATN MFR SERPL: 29 % (ref 20–50)
IRON SERPL-MCNC: 86 UG/DL (ref 65–175)
LYMPHOCYTES # BLD AUTO: 1.71 X10(3)/MCL (ref 0.6–4.6)
LYMPHOCYTES NFR BLD AUTO: 31.2 %
MCH RBC QN AUTO: 32.3 PG (ref 27–31)
MCHC RBC AUTO-ENTMCNC: 33.6 G/DL (ref 33–36)
MCV RBC AUTO: 95.9 FL (ref 80–94)
MONOCYTES # BLD AUTO: 0.46 X10(3)/MCL (ref 0.1–1.3)
MONOCYTES NFR BLD AUTO: 8.4 %
NEUTROPHILS # BLD AUTO: 3.04 X10(3)/MCL (ref 2.1–9.2)
NEUTROPHILS NFR BLD AUTO: 55.5 %
PLATELET # BLD AUTO: 167 X10(3)/MCL (ref 130–400)
PMV BLD AUTO: 9.2 FL (ref 7.4–10.4)
RBC # BLD AUTO: 4.4 X10(6)/MCL (ref 4.7–6.1)
TIBC SERPL-MCNC: 207 UG/DL (ref 60–240)
TIBC SERPL-MCNC: 293 UG/DL (ref 250–450)
TRANSFERRIN SERPL-MCNC: 275 MG/DL (ref 174–364)
VIT B12 SERPL-MCNC: 349 PG/ML (ref 213–816)
WBC # BLD AUTO: 5.48 X10(3)/MCL (ref 4.5–11.5)

## 2024-11-20 PROCEDURE — 36415 COLL VENOUS BLD VENIPUNCTURE: CPT | Mod: ,,, | Performed by: NURSE PRACTITIONER

## 2024-11-20 PROCEDURE — 82728 ASSAY OF FERRITIN: CPT | Performed by: NURSE PRACTITIONER

## 2024-11-20 PROCEDURE — 3078F DIAST BP <80 MM HG: CPT | Mod: CPTII,,, | Performed by: NURSE PRACTITIONER

## 2024-11-20 PROCEDURE — 82746 ASSAY OF FOLIC ACID SERUM: CPT | Performed by: NURSE PRACTITIONER

## 2024-11-20 PROCEDURE — 1160F RVW MEDS BY RX/DR IN RCRD: CPT | Mod: CPTII,,, | Performed by: NURSE PRACTITIONER

## 2024-11-20 PROCEDURE — 1159F MED LIST DOCD IN RCRD: CPT | Mod: CPTII,,, | Performed by: NURSE PRACTITIONER

## 2024-11-20 PROCEDURE — 3075F SYST BP GE 130 - 139MM HG: CPT | Mod: CPTII,,, | Performed by: NURSE PRACTITIONER

## 2024-11-20 PROCEDURE — 99214 OFFICE O/P EST MOD 30 MIN: CPT | Mod: ,,, | Performed by: NURSE PRACTITIONER

## 2024-11-20 PROCEDURE — 3008F BODY MASS INDEX DOCD: CPT | Mod: CPTII,,, | Performed by: NURSE PRACTITIONER

## 2024-11-20 PROCEDURE — 36415 COLL VENOUS BLD VENIPUNCTURE: CPT | Performed by: NURSE PRACTITIONER

## 2024-11-20 PROCEDURE — 83540 ASSAY OF IRON: CPT | Performed by: NURSE PRACTITIONER

## 2024-11-20 PROCEDURE — 85025 COMPLETE CBC W/AUTO DIFF WBC: CPT | Performed by: NURSE PRACTITIONER

## 2024-11-20 PROCEDURE — 82607 VITAMIN B-12: CPT | Performed by: NURSE PRACTITIONER

## 2024-11-20 PROCEDURE — 3044F HG A1C LEVEL LT 7.0%: CPT | Mod: CPTII,,, | Performed by: NURSE PRACTITIONER

## 2024-11-20 RX ORDER — TIRZEPATIDE 7.5 MG/.5ML
7.5 INJECTION, SOLUTION SUBCUTANEOUS
Qty: 2 ML | Refills: 2 | Status: SHIPPED | OUTPATIENT
Start: 2024-11-20

## 2024-11-20 RX ORDER — IRON,CARBONYL/ASCORBIC ACID 100-250 MG
1 TABLET ORAL DAILY
Qty: 30 TABLET | Refills: 11 | Status: SHIPPED | OUTPATIENT
Start: 2024-11-20

## 2024-11-20 RX ORDER — ROSUVASTATIN CALCIUM 20 MG/1
20 TABLET, COATED ORAL DAILY
Qty: 90 TABLET | Refills: 3 | Status: SHIPPED | OUTPATIENT
Start: 2024-11-20

## 2024-11-20 RX ORDER — VALACYCLOVIR HYDROCHLORIDE 1 G/1
2000 TABLET, FILM COATED ORAL EVERY 12 HOURS
Qty: 30 TABLET | Refills: 11 | Status: SHIPPED | OUTPATIENT
Start: 2024-11-20

## 2024-11-20 RX ORDER — PANTOPRAZOLE SODIUM 40 MG/1
40 TABLET, DELAYED RELEASE ORAL DAILY
Qty: 90 TABLET | Refills: 3 | Status: SHIPPED | OUTPATIENT
Start: 2024-11-20

## 2024-11-20 RX ORDER — MONTELUKAST SODIUM 10 MG/1
10 TABLET ORAL NIGHTLY
Qty: 90 TABLET | Refills: 3 | Status: SHIPPED | OUTPATIENT
Start: 2024-11-20

## 2024-11-20 NOTE — PROGRESS NOTES
Subjective:       Patient ID: Luz Larsen is a 39 y.o. male.    Chief Complaint: Anemia (6m fu) and Obesity (6m fu)      HPI   This is a 39-year-old white male who presents to clinic today for a  3 month follow-up for anemia and obesity.  Reports blood counts were better.  Had colonoscopy but did not schedule EGD.  Reports feels like he has plateaued significantly with the Wegovy.  Review of Systems  Comprehensive review of systems negative except as stated in HPI    The patient's Health Maintenance was reviewed and the following appears to be due:   There are no preventive care reminders to display for this patient.    Past Medical History:  Past Medical History:   Diagnosis Date    Elevated liver enzymes     GERD (gastroesophageal reflux disease)     Hyperlipidemia     Obstructive sleep apnea     Steatosis of liver      Past Surgical History:   Procedure Laterality Date    TONSILLECTOMY  10/20/1998    TONSILLECTOMY AND ADENOIDECTOMY       Review of patient's allergies indicates:   Allergen Reactions    Azithromycin Rash     Current Outpatient Medications on File Prior to Visit   Medication Sig Dispense Refill    coenzyme Q10 200 mg capsule Take 200 mg by mouth once daily.      iron-vitamin C 100-250 mg, ICAR-C, (ICAR-C) 100-250 mg Tab Take 1 tablet by mouth Daily. 30 tablet 11    montelukast (SINGULAIR) 10 mg tablet TAKE 1 TABLET BY MOUTH EVERY DAY IN THE EVENING 90 tablet 3    pantoprazole (PROTONIX) 40 MG tablet TAKE 1 TABLET BY MOUTH EVERY DAY 90 tablet 3    rosuvastatin (CRESTOR) 20 MG tablet TAKE 1 TABLET BY MOUTH EVERY DAY 90 tablet 3    valACYclovir (VALTREX) 1000 MG tablet Take 2 tablets (2,000 mg total) by mouth every 12 (twelve) hours. 30 tablet 11    [DISCONTINUED] WEGOVY 2.4 mg/0.75 mL PnIj INJECT 2.4 MG INTO THE SKIN EVERY 7 DAYS. 3 each 5    [DISCONTINUED] MILK THISTLE ORAL Take 1,000 mg by mouth.       No current facility-administered medications on file prior to visit.     Social History  "    Socioeconomic History    Marital status:    Tobacco Use    Smoking status: Never     Passive exposure: Past    Smokeless tobacco: Never   Substance and Sexual Activity    Alcohol use: Not Currently     Comment: seldom    Drug use: Never    Sexual activity: Yes     Partners: Female     Birth control/protection: None     Social Drivers of Health     Financial Resource Strain: Patient Declined (1/31/2024)    Overall Financial Resource Strain (CARDIA)     Difficulty of Paying Living Expenses: Patient declined   Food Insecurity: Patient Declined (1/31/2024)    Hunger Vital Sign     Worried About Running Out of Food in the Last Year: Patient declined     Ran Out of Food in the Last Year: Patient declined   Transportation Needs: Patient Declined (1/31/2024)    PRAPARE - Transportation     Lack of Transportation (Medical): Patient declined     Lack of Transportation (Non-Medical): Patient declined   Physical Activity: Insufficiently Active (1/31/2024)    Exercise Vital Sign     Days of Exercise per Week: 3 days     Minutes of Exercise per Session: 10 min   Stress: No Stress Concern Present (1/31/2024)    [employer] [place] [employer] - Occupational Stress Questionnaire     Feeling of Stress : Only a little   Housing Stability: Patient Declined (1/31/2024)    Housing Stability Vital Sign     Unable to Pay for Housing in the Last Year: Patient declined     Number of Places Lived in the Last Year: 1     Unstable Housing in the Last Year: Patient declined     No family history on file.    Objective:       /78 (BP Location: Left arm)   Pulse 83   Temp 97.8 °F (36.6 °C) (Oral)   Resp 16   Ht 5' 8" (1.727 m)   Wt 99.8 kg (220 lb)   SpO2 98%   BMI 33.45 kg/m²      Physical Exam  Vitals and nursing note reviewed.   Constitutional:       Appearance: Normal appearance. He is obese.   HENT:      Head: Normocephalic and atraumatic.      Right Ear: Tympanic membrane, ear canal and external ear normal. "      Left Ear: Tympanic membrane, ear canal and external ear normal.      Nose: Nose normal.      Mouth/Throat:      Mouth: Mucous membranes are moist.      Pharynx: Oropharynx is clear.   Eyes:      Extraocular Movements: Extraocular movements intact.      Conjunctiva/sclera: Conjunctivae normal.      Pupils: Pupils are equal, round, and reactive to light.   Cardiovascular:      Rate and Rhythm: Normal rate and regular rhythm.      Pulses: Normal pulses.      Heart sounds: Normal heart sounds.   Pulmonary:      Effort: Pulmonary effort is normal.      Breath sounds: Normal breath sounds.   Musculoskeletal:         General: Normal range of motion.      Cervical back: Normal range of motion and neck supple.   Skin:     General: Skin is warm and dry.   Neurological:      General: No focal deficit present.      Mental Status: He is alert and oriented to person, place, and time.   Psychiatric:         Mood and Affect: Mood normal.         Behavior: Behavior normal.         Thought Content: Thought content normal.         Judgment: Judgment normal.         Labs  Lab Visit on 08/16/2024   Component Date Value Ref Range Status    Ferritin Level 08/16/2024 73.80  21.81 - 274.66 ng/mL Final    Iron Binding Capacity Unsaturated 08/16/2024 198  69 - 240 ug/dL Final    Iron Level 08/16/2024 102  65 - 175 ug/dL Final    Transferrin 08/16/2024 260  174 - 364 mg/dL Final    Iron Binding Capacity Total 08/16/2024 300  250 - 450 ug/dL Final    Iron Saturation 08/16/2024 34  20 - 50 % Final    WBC 08/16/2024 5.45  4.50 - 11.50 x10(3)/mcL Final    RBC 08/16/2024 4.24 (L)  4.70 - 6.10 x10(6)/mcL Final    Hgb 08/16/2024 13.5 (L)  14.0 - 18.0 g/dL Final    Hct 08/16/2024 40.1 (L)  42.0 - 52.0 % Final    MCV 08/16/2024 94.6 (H)  80.0 - 94.0 fL Final    MCH 08/16/2024 31.8 (H)  27.0 - 31.0 pg Final    MCHC 08/16/2024 33.7  33.0 - 36.0 g/dL Final    RDW 08/16/2024 13.1  11.5 - 17.0 % Final    Platelet 08/16/2024 160  130 - 400 x10(3)/mcL  Final    MPV 08/16/2024 9.1  7.4 - 10.4 fL Final    Neut % 08/16/2024 50.1  % Final    Lymph % 08/16/2024 35.0  % Final    Mono % 08/16/2024 10.1  % Final    Eos % 08/16/2024 4.0  % Final    Basophil % 08/16/2024 0.6  % Final    Lymph # 08/16/2024 1.91  0.6 - 4.6 x10(3)/mcL Final    Neut # 08/16/2024 2.73  2.1 - 9.2 x10(3)/mcL Final    Mono # 08/16/2024 0.55  0.1 - 1.3 x10(3)/mcL Final    Eos # 08/16/2024 0.22  0 - 0.9 x10(3)/mcL Final    Baso # 08/16/2024 0.03  <=0.2 x10(3)/mcL Final    IG# 08/16/2024 0.01  0 - 0.04 x10(3)/mcL Final    IG% 08/16/2024 0.2  % Final   Documentation Only on 07/18/2024   Component Date Value Ref Range Status    CRC Recommendation External 07/18/2024 No follow-up frequency specified   Final       Assessment and Plan       ICD-10-CM ICD-9-CM   1. Iron deficiency anemia, unspecified iron deficiency anemia type  D50.9 280.9   2. Morbid obesity  E66.01 278.01        1. Iron deficiency anemia, unspecified iron deficiency anemia type  Overview:  04/18/22 16:24  RBC: 4.49  Hemoglobin: 13.7  Hematocrit: 42.0    04/22/23 10:17  RBC: 4.54 (L)  Hemoglobin: 12.6 (L)  Hematocrit: 39.2 (L)  Iron: 82  TIBC: 345  Iron Binding Capacity Unsaturated: 263 (H)  Transferrin: 321  Iron Saturation: 24    07/25/23 06:48  RBC: 4.51 (L)  Hemoglobin: 12.9 (L)  Hematocrit: 40.6 (L)    08/12/2023, 08/13/2023, 08/14/2023 - Stool OB negative    04/29/24 08:05  RBC: 4.41 (L)  Hemoglobin: 12.6 (L)  Hematocrit: 39.2 (L)  Iron: 37 (L)  TIBC: 343  Iron Binding Capacity Unsaturated: 306 (H)  Transferrin: 309  Iron Saturation: 11 (L)    05/20/2024 - Established with hematology Dr Katharine Webb    07/18/2024 - colonoscopy with Dr. Santana--normal mucosa in the terminal ileum, polyp in the rectum, grade 1/stage I internal hemorrhoids.           Assessment & Plan:  Last blood counts with Hematology improved, due for blood work now, will draw today and forward to Hematology.  Follow-up with Hematology in 3 months as  scheduled.    Orders:  -     CBC Auto Differential  -     Iron and TIBC  -     Vitamin B12  -     Folate  -     Ferritin    2. Morbid obesity  Overview:  04/25/2023 - 251 lb, BMI 38.29, start Wegovy and titrate up  07/27/2023 - 224 lb, BMI 34.09, increase Wegovy to maintenance dose of 2.4 mg weekly  02/01/2024 - 215 lb, BMI 32.69, continue maintenance dose Wegovy 2.4 mg weekly  04/30/2024 - 224 lb, BMI 34.06  11/20/2024 - 220 lb, BMI 33.45, discontinue Wegovy, trial Zepbound    Assessment & Plan:  Patient has plateaued with the Wegovy, is no longer losing any weight, is exercising and maintaining a calorie deficit diet.  Will try and transition to Zepbound 7.5 mg weekly and titrate up.  Follow-up in 3 months.    Orders:  -     tirzepatide, weight loss, (ZEPBOUND) 7.5 mg/0.5 mL PnIj; Inject 7.5 mg into the skin every 7 days.  Dispense: 2 mL; Refill: 2           Follow up in about 3 months (around 2/20/2025) for follow up.

## 2024-11-20 NOTE — ASSESSMENT & PLAN NOTE
Last blood counts with Hematology improved, due for blood work now, will draw today and forward to Hematology.  Follow-up with Hematology in 3 months as scheduled.

## 2024-11-20 NOTE — PROGRESS NOTES
Patient presents for lab draw. R AC, tolerated well. Labs sent to [facility]    2TT,1P

## 2024-11-20 NOTE — ASSESSMENT & PLAN NOTE
Patient has plateaued with the Wegovy, is no longer losing any weight, is exercising and maintaining a calorie deficit diet.  Will try and transition to Zepbound 7.5 mg weekly and titrate up.  Follow-up in 3 months.

## 2024-11-21 DIAGNOSIS — E53.8 FOLATE DEFICIENCY: Primary | ICD-10-CM

## 2024-11-21 RX ORDER — FOLIC ACID 1 MG/1
1 TABLET ORAL DAILY
Qty: 30 TABLET | Refills: 11 | Status: SHIPPED | OUTPATIENT
Start: 2024-11-21 | End: 2025-11-21

## 2024-11-21 NOTE — PROGRESS NOTES
Overall, labs look good except his folate level is a little bit low.  I will send in a prescription of folic acid daily.  His blood counts are significantly better.

## 2025-02-17 ENCOUNTER — TELEPHONE (OUTPATIENT)
Dept: FAMILY MEDICINE | Facility: CLINIC | Age: 40
End: 2025-02-17
Payer: COMMERCIAL

## 2025-02-17 NOTE — TELEPHONE ENCOUNTER
Are there any outstanding tasks in patient's chart?    Labs for Dr. Woodall  2. Do we have outstanding/pending referrals?    n  3. Has the patient been seen in an ER, Urgent Care, or admitted since last visit?    n  4. Has patient seen any other health care providers since last visit?    n  5.  Has patient had any blood work or x-rays done since last visit?   n

## 2025-02-24 ENCOUNTER — LAB VISIT (OUTPATIENT)
Dept: LAB | Facility: HOSPITAL | Age: 40
End: 2025-02-24
Attending: NURSE PRACTITIONER
Payer: COMMERCIAL

## 2025-02-24 ENCOUNTER — OFFICE VISIT (OUTPATIENT)
Dept: FAMILY MEDICINE | Facility: CLINIC | Age: 40
End: 2025-02-24
Payer: COMMERCIAL

## 2025-02-24 VITALS — HEIGHT: 68 IN | BODY MASS INDEX: 33.34 KG/M2 | WEIGHT: 220 LBS

## 2025-02-24 DIAGNOSIS — E66.01 MORBID OBESITY: Primary | ICD-10-CM

## 2025-02-24 DIAGNOSIS — G47.33 OBSTRUCTIVE SLEEP APNEA SYNDROME: ICD-10-CM

## 2025-02-24 DIAGNOSIS — R11.0 NAUSEA: ICD-10-CM

## 2025-02-24 DIAGNOSIS — E66.01 MORBID OBESITY: ICD-10-CM

## 2025-02-24 DIAGNOSIS — D50.0 IRON DEFICIENCY ANEMIA DUE TO CHRONIC BLOOD LOSS: ICD-10-CM

## 2025-02-24 DIAGNOSIS — D50.9 IRON DEFICIENCY ANEMIA, UNSPECIFIED IRON DEFICIENCY ANEMIA TYPE: ICD-10-CM

## 2025-02-24 LAB
BASOPHILS # BLD AUTO: 0.04 X10(3)/MCL
BASOPHILS NFR BLD AUTO: 0.6 %
EOSINOPHIL # BLD AUTO: 0.34 X10(3)/MCL (ref 0–0.9)
EOSINOPHIL NFR BLD AUTO: 5 %
ERYTHROCYTE [DISTWIDTH] IN BLOOD BY AUTOMATED COUNT: 12 % (ref 11.5–17)
FERRITIN SERPL-MCNC: 81.5 NG/ML (ref 21.81–274.66)
FOLATE SERPL-MCNC: 19 NG/ML (ref 7–31.4)
HCT VFR BLD AUTO: 44.3 % (ref 42–52)
HGB BLD-MCNC: 14.6 G/DL (ref 14–18)
IMM GRANULOCYTES # BLD AUTO: 0 X10(3)/MCL (ref 0–0.04)
IMM GRANULOCYTES NFR BLD AUTO: 0 %
IRON SATN MFR SERPL: 29 % (ref 20–50)
IRON SERPL-MCNC: 100 UG/DL (ref 65–175)
LYMPHOCYTES # BLD AUTO: 2.24 X10(3)/MCL (ref 0.6–4.6)
LYMPHOCYTES NFR BLD AUTO: 32.9 %
MCH RBC QN AUTO: 31.4 PG (ref 27–31)
MCHC RBC AUTO-ENTMCNC: 33 G/DL (ref 33–36)
MCV RBC AUTO: 95.3 FL (ref 80–94)
MONOCYTES # BLD AUTO: 0.57 X10(3)/MCL (ref 0.1–1.3)
MONOCYTES NFR BLD AUTO: 8.4 %
NEUTROPHILS # BLD AUTO: 3.62 X10(3)/MCL (ref 2.1–9.2)
NEUTROPHILS NFR BLD AUTO: 53.1 %
PLATELET # BLD AUTO: 201 X10(3)/MCL (ref 130–400)
PMV BLD AUTO: 9.1 FL (ref 7.4–10.4)
RBC # BLD AUTO: 4.65 X10(6)/MCL (ref 4.7–6.1)
TIBC SERPL-MCNC: 244 UG/DL (ref 60–240)
TIBC SERPL-MCNC: 344 UG/DL (ref 250–450)
TRANSFERRIN SERPL-MCNC: 306 MG/DL (ref 174–364)
VIT B12 SERPL-MCNC: 423 PG/ML (ref 213–816)
WBC # BLD AUTO: 6.81 X10(3)/MCL (ref 4.5–11.5)

## 2025-02-24 PROCEDURE — 83540 ASSAY OF IRON: CPT

## 2025-02-24 PROCEDURE — 36415 COLL VENOUS BLD VENIPUNCTURE: CPT

## 2025-02-24 PROCEDURE — 85025 COMPLETE CBC W/AUTO DIFF WBC: CPT

## 2025-02-24 PROCEDURE — 82728 ASSAY OF FERRITIN: CPT

## 2025-02-24 PROCEDURE — 82746 ASSAY OF FOLIC ACID SERUM: CPT

## 2025-02-24 PROCEDURE — 82607 VITAMIN B-12: CPT

## 2025-02-24 RX ORDER — ONDANSETRON 8 MG/1
8 TABLET, ORALLY DISINTEGRATING ORAL EVERY 6 HOURS PRN
Qty: 30 TABLET | Refills: 11 | Status: SHIPPED | OUTPATIENT
Start: 2025-02-24

## 2025-02-24 RX ORDER — TIRZEPATIDE 7.5 MG/.5ML
7.5 INJECTION, SOLUTION SUBCUTANEOUS
Qty: 2 ML | Refills: 2 | Status: SHIPPED | OUTPATIENT
Start: 2025-02-24

## 2025-02-24 NOTE — PROGRESS NOTES
TELEMEDICINE VISIT     Patient ID: Luz Larsen is a 40 y.o. male.  MRN: [id number]  : 1985    Subjective:        TELEMEDICINE  The patient location is: home  The chief complaint leading to consultation is: Obesity (3m fu)     Visit type: Virtual visit with synchronous audio and video    Total time spent with patient: 20 minutes  30 minutes of total time spent on the encounter, which includes face to face time and non-face to face time preparing to see the patient (eg, review of tests), obtaining and/or reviewing separately obtained history, documenting clinical information in the electronic or other health record, independently interpreting results (not separately reported) and communicating results to the patient/family/caregiver, or care coordination (not separately reported).    Each patient to whom he or she provides medical services by telemedicine is:  (1) informed of the relationship between the physician and patient and the respective role of any other health care provider with respect to management of the patient; and (2) notified that he or she may decline to receive medical services by telemedicine and may withdraw from such care at any time.    History of Present Illness    CHIEF COMPLAINT:  Patient seen via virtual visit today for follow-up on obesity and anemia    WEIGHT MANAGEMENT:  He took Zepbound for one month and experienced nausea as a side effect. He requires a refill.    ANEMIA:  He has unexplained iron deficiency anemia with upcoming hematology follow-up. Iron levels have improved on recent labs. Folate level was slightly below normal range.    SLEEP APNEA:  He reports consistent nightly CPAP machine use.           Health maintenance reviewed with the patient.  Health maintenance completed:  Health Maintenance Topics with due status: Not Due       Topic Last Completion Date    Hemoglobin A1c (Diabetic Prevention Screening) 2024    Lipid Panel 2024    TETANUS VACCINE  "04/30/2024    RSV Vaccine (Age 60+ and Pregnant patients) Not Due      Health maintenance due:  There are no preventive care reminders to display for this patient.   ROS:  Review of Systems   Constitutional:  Negative for activity change and unexpected weight change.   HENT:  Positive for rhinorrhea. Negative for hearing loss and trouble swallowing.    Eyes:  Negative for discharge and visual disturbance.   Respiratory:  Negative for chest tightness and wheezing.    Cardiovascular:  Negative for chest pain and palpitations.   Gastrointestinal:  Negative for blood in stool, constipation, diarrhea and vomiting.   Endocrine: Negative for polydipsia and polyuria.   Genitourinary:  Negative for difficulty urinating, hematuria and urgency.   Musculoskeletal:  Negative for arthralgias, joint swelling and neck pain.   Neurological:  Negative for weakness and headaches.   Psychiatric/Behavioral:  Negative for confusion and dysphoric mood.       Complete ROS negative except as stated in HPI  History:     Past Medical History:   Diagnosis Date    Elevated liver enzymes     GERD (gastroesophageal reflux disease)     Hyperlipidemia     Obstructive sleep apnea     Steatosis of liver       Past Surgical History:   Procedure Laterality Date    TONSILLECTOMY  10/20/1998    TONSILLECTOMY AND ADENOIDECTOMY       No family history on file.   Social History     Tobacco Use    Smoking status: Never     Passive exposure: Past    Smokeless tobacco: Never   Substance and Sexual Activity    Alcohol use: Not Currently     Comment: seldom    Drug use: Never    Sexual activity: Yes     Partners: Female     Birth control/protection: None          Allergies:   Review of patient's allergies indicates:   Allergen Reactions    Azithromycin Rash     Objective:     Vitals:    02/24/25 1532   Weight: 99.8 kg (220 lb)   Height: 5' 8" (1.727 m)         Physical Examination:   Physical Exam  Constitutional:       Appearance: Normal appearance.   HENT:     "  Head: Normocephalic and atraumatic.   Neurological:      General: No focal deficit present.      Mental Status: He is alert and oriented to person, place, and time.   Psychiatric:         Mood and Affect: Mood normal.         Behavior: Behavior normal.         Thought Content: Thought content normal.         Judgment: Judgment normal.           Medications:     Medication List with Changes/Refills   New Medications    ONDANSETRON (ZOFRAN-ODT) 8 MG TBDL    Take 1 tablet (8 mg total) by mouth every 6 (six) hours as needed (nausea).   Current Medications    COENZYME Q10 200 MG CAPSULE    Take 200 mg by mouth once daily.    FOLIC ACID (FOLVITE) 1 MG TABLET    Take 1 tablet (1 mg total) by mouth once daily.    IRON-VITAMIN C 100-250 MG, ICAR-C, (ICAR-C) 100-250 MG TAB    Take 1 tablet by mouth Daily.    MONTELUKAST (SINGULAIR) 10 MG TABLET    Take 1 tablet (10 mg total) by mouth every evening.    PANTOPRAZOLE (PROTONIX) 40 MG TABLET    Take 1 tablet (40 mg total) by mouth once daily.    ROSUVASTATIN (CRESTOR) 20 MG TABLET    Take 1 tablet (20 mg total) by mouth once daily.    VALACYCLOVIR (VALTREX) 1000 MG TABLET    Take 2 tablets (2,000 mg total) by mouth every 12 (twelve) hours.   Changed and/or Refilled Medications    Modified Medication Previous Medication    TIRZEPATIDE, WEIGHT LOSS, (ZEPBOUND) 7.5 MG/0.5 ML PNIJ tirzepatide, weight loss, (ZEPBOUND) 7.5 mg/0.5 mL PnIj       Inject 7.5 mg into the skin every 7 days.    Inject 7.5 mg into the skin every 7 days.     Assessment and Plan     Assessment & Plan    E66.01 Morbid obesity  D50.9 Iron deficiency anemia, unspecified iron deficiency anemia type  G47.33 Obstructive sleep apnea syndrome  R11.0 Nausea    IMPRESSION:  - Evaluated patient's obesity and anemia management  - Considered restarting Zepbound at 7.5 mg dose despite previous side effects per patient request  - Acknowledged improvement in iron deficiency anemia based on recent labs  - Noted persistent  unexplained etiology of iron deficiency anemia  - Recognized slightly low folate levels (0.1 below normal)  - Continued to monitor sleep apnea management with CPAP use    E66.01 MORBID OBESITY:  - Restarted Zepbound 7.5 mg with 2 refills after evaluating patient's condition, noting a one-month lapse due to refill issues and nausea.  - prescribe Zofran to take as needed for nausea  - Scheduled a follow-up wellness visit in 3 months to monitor progress and perform comprehensive labs.    D50.9 IRON DEFICIENCY ANEMIA, UNSPECIFIED IRON DEFICIENCY ANEMIA TYPE:  - encouraged patient to complete labs ordered by Hematology for appointment tomorrow.  Instructed patient he can go to [facility] this afternoon to get labs done and they will be ready for his appointment tomorrow.  - Instructed patient to contact hematology office to request a virtual appointment if possible and follow up at scheduled appointment tomorrow.    G47.33 OBSTRUCTIVE SLEEP APNEA SYNDROME:  - Instructed patient to continue using CPAP machine nightly for sleep apnea management.    R11.0 NAUSEA:  - Prescribed Zofran for nausea management associated with Zepbound use.          1. Morbid obesity  Overview:  04/25/2023 - 251 lb, BMI 38.29, start Wegovy and titrate up  07/27/2023 - 224 lb, BMI 34.09, increase Wegovy to maintenance dose of 2.4 mg weekly  02/01/2024 - 215 lb, BMI 32.69, continue maintenance dose Wegovy 2.4 mg weekly  04/30/2024 - 224 lb, BMI 34.06  11/20/2024 - 220 lb, BMI 33.45, discontinue Wegovy, trial Zepbound (only took for 1 month)  02/24/2025 - 220 lb, BMI 33.45, restart Zepbound, RX Zofran for nausea     Orders:  -     tirzepatide, weight loss, (ZEPBOUND) 7.5 mg/0.5 mL PnIj; Inject 7.5 mg into the skin every 7 days.  Dispense: 2 mL; Refill: 2    2. Iron deficiency anemia, unspecified iron deficiency anemia type  Overview:  04/18/22 16:24  RBC: 4.49  Hemoglobin: 13.7  Hematocrit: 42.0    04/22/23 10:17  RBC: 4.54  (L)  Hemoglobin: 12.6 (L)  Hematocrit: 39.2 (L)  Iron: 82  TIBC: 345  Iron Binding Capacity Unsaturated: 263 (H)  Transferrin: 321  Iron Saturation: 24    07/25/23 06:48  RBC: 4.51 (L)  Hemoglobin: 12.9 (L)  Hematocrit: 40.6 (L)    08/12/2023, 08/13/2023, 08/14/2023 - Stool OB negative    04/29/24 08:05  RBC: 4.41 (L)  Hemoglobin: 12.6 (L)  Hematocrit: 39.2 (L)  Iron: 37 (L)  TIBC: 343  Iron Binding Capacity Unsaturated: 306 (H)  Transferrin: 309  Iron Saturation: 11 (L)    05/20/2024 - Established with hematology Dr Katharine Webb    07/18/2024 - colonoscopy with Dr. Santana--normal mucosa in the terminal ileum, polyp in the rectum, grade 1/stage I internal hemorrhoids.     11/20/2024 - H&H 14.2 and 42.2, iron 86, folate 6.9            3. Obstructive sleep apnea syndrome  Overview:  Home sleep study with [facility] 04/20/2021  AHI 28.1  Auto Pap 5-20 cm H20     Assessment & Plan:  Patient reports nightly compliance of at least 4 hours per night with auto PAP therapy.  Patient is using and benefitting from nightly auto PAP therapy, recommend continued auto PAP therapy at 5-20 cm H2O.        4. Nausea  -     ondansetron (ZOFRAN-ODT) 8 MG TbDL; Take 1 tablet (8 mg total) by mouth every 6 (six) hours as needed (nausea).  Dispense: 30 tablet; Refill: 11              Follow Up:   Follow up in 2 months (on 5/1/2025) for Annual.    I spent greater than 30 minutes today both in chart review and greater than 50% of that time in discussion with the patient regarding health maintenance, diagnoses, diagnostic tests, medications, treatments, symptom management, expected results and adverse effects. Patient verbalized understanding and all questions were answered.      This note was generated with the assistance of ambient listening technology. Verbal consent was obtained by the patient and accompanying visitor(s) for the recording of patient appointment to facilitate this note. I attest to having reviewed and edited the  generated note for accuracy, though some syntax or spelling errors may persist. Please contact the author of this note for any clarification.

## 2025-02-25 ENCOUNTER — OFFICE VISIT (OUTPATIENT)
Dept: HEMATOLOGY/ONCOLOGY | Facility: CLINIC | Age: 40
End: 2025-02-25
Payer: COMMERCIAL

## 2025-02-25 DIAGNOSIS — D50.0 IRON DEFICIENCY ANEMIA DUE TO CHRONIC BLOOD LOSS: Primary | ICD-10-CM

## 2025-02-25 DIAGNOSIS — E53.8 FOLIC ACID DEFICIENCY: ICD-10-CM

## 2025-02-25 DIAGNOSIS — E66.01 MORBID OBESITY: ICD-10-CM

## 2025-02-25 PROCEDURE — 1160F RVW MEDS BY RX/DR IN RCRD: CPT | Mod: CPTII,95,, | Performed by: NURSE PRACTITIONER

## 2025-02-25 PROCEDURE — 98005 SYNCH AUDIO-VIDEO EST LOW 20: CPT | Mod: 95,,, | Performed by: NURSE PRACTITIONER

## 2025-02-25 PROCEDURE — 1159F MED LIST DOCD IN RCRD: CPT | Mod: CPTII,95,, | Performed by: NURSE PRACTITIONER

## 2025-02-25 NOTE — PROGRESS NOTES
Referring physician: KRISTEL Narvaez  Reason for referral: Iron Deficiency Anemia      Subjective:       Patient ID: Luz Larsen is a 40 y.o. male.    Iron Deficiency Anemia--Diagnosed 4/2024    Work-up:  8/14/23--Stool for occult blood negative X 3.  4/29/24--iron 37, TIBC 306, iron saturation 11%.    S/p colonoscopy on 7/18/24 with Dr. Santana--normal mucosa in the terminal ileum, polyp in the rectum, grade 1/stage I internal hemorrhoids.     Current treatment: Oral iron daily started 4/2024    Chief Complaint: 6 Month Follow Up (Virtual Visit.//Labs Results.)    HPI  The patient presents today for scheduled follow-up for his SANTOS. He continues daily oral iron now with normalizing iron studies and hemoglobin.  He also takes daily folic acid with normalized folate level.  He underwent colonoscopy last month that did not reveal any bleeding. EGD was offered but patient has declined due to cost. He continues on Protonix daily. No other problems reported.  He requested future follow-up with PCP for which he discussed with her.  Dr. Webb is good with this plan.  Explained to patient should there be future needs that warrant hematology concerns feel free to call back for reappointment.    Past Medical History:   Diagnosis Date    Elevated liver enzymes     GERD (gastroesophageal reflux disease)     Hyperlipidemia     Obstructive sleep apnea     Steatosis of liver       Past Surgical History:   Procedure Laterality Date    TONSILLECTOMY  10/20/1998    TONSILLECTOMY AND ADENOIDECTOMY       No family history on file.  Social History     Socioeconomic History    Marital status:    Tobacco Use    Smoking status: Never     Passive exposure: Past    Smokeless tobacco: Never   Substance and Sexual Activity    Alcohol use: Not Currently     Comment: seldom    Drug use: Never    Sexual activity: Yes     Partners: Female     Birth control/protection: None     Social Drivers of Health     Financial Resource  Strain: Patient Declined (1/31/2024)    Overall Financial Resource Strain (CARDIA)     Difficulty of Paying Living Expenses: Patient declined   Food Insecurity: Patient Declined (1/31/2024)    Hunger Vital Sign     Worried About Running Out of Food in the Last Year: Patient declined     Ran Out of Food in the Last Year: Patient declined   Transportation Needs: Patient Declined (1/31/2024)    PRAPARE - Transportation     Lack of Transportation (Medical): Patient declined     Lack of Transportation (Non-Medical): Patient declined   Physical Activity: Insufficiently Active (1/31/2024)    Exercise Vital Sign     Days of Exercise per Week: 3 days     Minutes of Exercise per Session: 10 min   Stress: No Stress Concern Present (1/31/2024)    [employer] [place] [employer] - Occupational Stress Questionnaire     Feeling of Stress : Only a little   Housing Stability: Patient Declined (1/31/2024)    Housing Stability Vital Sign     Unable to Pay for Housing in the Last Year: Patient declined     Number of Places Lived in the Last Year: 1     Unstable Housing in the Last Year: Patient declined       Review of patient's allergies indicates:   Allergen Reactions    Azithromycin Rash      Current Outpatient Medications on File Prior to Visit   Medication Sig Dispense Refill    coenzyme Q10 200 mg capsule Take 200 mg by mouth once daily.      folic acid (FOLVITE) 1 MG tablet Take 1 tablet (1 mg total) by mouth once daily. 30 tablet 11    iron-vitamin C 100-250 mg, ICAR-C, (ICAR-C) 100-250 mg Tab Take 1 tablet by mouth Daily. 30 tablet 11    montelukast (SINGULAIR) 10 mg tablet Take 1 tablet (10 mg total) by mouth every evening. 90 tablet 3    ondansetron (ZOFRAN-ODT) 8 MG TbDL Take 1 tablet (8 mg total) by mouth every 6 (six) hours as needed (nausea). 30 tablet 11    pantoprazole (PROTONIX) 40 MG tablet Take 1 tablet (40 mg total) by mouth once daily. 90 tablet 3    rosuvastatin (CRESTOR) 20 MG tablet Take 1 tablet (20  mg total) by mouth once daily. 90 tablet 3    tirzepatide, weight loss, (ZEPBOUND) 7.5 mg/0.5 mL PnIj Inject 7.5 mg into the skin every 7 days. 2 mL 2    valACYclovir (VALTREX) 1000 MG tablet Take 2 tablets (2,000 mg total) by mouth every 12 (twelve) hours. 30 tablet 11     No current facility-administered medications on file prior to visit.      Review of Systems   Constitutional:  Negative for appetite change and unexpected weight change.   HENT:  Negative for mouth sores.    Eyes:  Negative for visual disturbance.   Respiratory:  Negative for cough and shortness of breath.    Cardiovascular:  Negative for chest pain.   Gastrointestinal:  Negative for abdominal pain and diarrhea.   Genitourinary:  Negative for frequency.   Musculoskeletal:  Negative for back pain.   Integumentary:  Negative for rash.   Neurological:  Negative for headaches.   Hematological:  Negative for adenopathy.   Psychiatric/Behavioral:  The patient is not nervous/anxious.         There were no vitals filed for this visit.      Lab Visit on 02/24/2025   Component Date Value Ref Range Status    Iron Binding Capacity Unsaturated 02/24/2025 244 (H)  60 - 240 ug/dL Final    Iron Level 02/24/2025 100  65 - 175 ug/dL Final    Transferrin 02/24/2025 306  174 - 364 mg/dL Final    Iron Binding Capacity Total 02/24/2025 344  250 - 450 ug/dL Final    Iron Saturation 02/24/2025 29  20 - 50 % Final    Ferritin Level 02/24/2025 81.50  21.81 - 274.66 ng/mL Final    Folate Level 02/24/2025 19.0  7.0 - 31.4 ng/mL Final    Vitamin B12 02/24/2025 423  213 - 816 pg/mL Final    WBC 02/24/2025 6.81  4.50 - 11.50 x10(3)/mcL Final    RBC 02/24/2025 4.65 (L)  4.70 - 6.10 x10(6)/mcL Final    Hgb 02/24/2025 14.6  14.0 - 18.0 g/dL Final    Hct 02/24/2025 44.3  42.0 - 52.0 % Final    MCV 02/24/2025 95.3 (H)  80.0 - 94.0 fL Final    MCH 02/24/2025 31.4 (H)  27.0 - 31.0 pg Final    MCHC 02/24/2025 33.0  33.0 - 36.0 g/dL Final    RDW 02/24/2025 12.0  11.5 - 17.0 % Final     Platelet 02/24/2025 201  130 - 400 x10(3)/mcL Final    MPV 02/24/2025 9.1  7.4 - 10.4 fL Final    Neut % 02/24/2025 53.1  % Final    Lymph % 02/24/2025 32.9  % Final    Mono % 02/24/2025 8.4  % Final    Eos % 02/24/2025 5.0  % Final    Basophil % 02/24/2025 0.6  % Final    Imm Grans % 02/24/2025 0.0  % Final    Neut # 02/24/2025 3.62  2.1 - 9.2 x10(3)/mcL Final    Lymph # 02/24/2025 2.24  0.6 - 4.6 x10(3)/mcL Final    Mono # 02/24/2025 0.57  0.1 - 1.3 x10(3)/mcL Final    Eos # 02/24/2025 0.34  0 - 0.9 x10(3)/mcL Final    Baso # 02/24/2025 0.04  <=0.2 x10(3)/mcL Final    Imm Gran # 02/24/2025 0.00  0.00 - 0.04 x10(3)/mcL Final         Assessment:       1. Iron deficiency anemia due to chronic blood loss    2. Folic acid deficiency    3. Morbid obesity         Plan:       Patient with SANTOS, unexplained.  Had stool for occult blood checked in 2023 negative X 3.     Referred to GI for EGD/colonoscopy given that we don't have any reason for the iron deficiency.  S/p colonoscopy on 7/18/24 with Dr. Santana--normal mucosa in the terminal ileum, polyp in the rectum, grade 1/stage I internal hemorrhoids. Plan for EGD in the near future.   Patient has declined EGD at this time due to financial reasons.   Also may need work up for celiac disease.    Recent labs show improved anemia with Hgb of 14.56g/dL. Iron level 100, ferritin 81.50. MCV and MCH now elevated.   Started oral iron in April 2024.   Suspect anemia could be malabsorption due to long-term PPI usage which may need to be addressed if no other etiology found.  Continue oral iron.  Continue folic acid daily now normal.  He asked if it was okay to have future follow-up regarding anemia management treatment with PCP.  Dr. Webb agrees.  He has follow-up with her 5/1/2025.  I explained to patient should there be future need for us to call for reappointment.  Future refills will be deferred to PCP.    RTC prn as above.   All questions answered at this time.    The patient  location is: [facility]  The chief complaint leading to consultation is:  Iron deficiency anemia and folic acid deficiency    Visit type: audiovisual    Face to Face time with patient: 11  21 minutes of total time spent on the encounter, which includes face to face time and non-face to face time preparing to see the patient (eg, review of tests), Obtaining and/or reviewing separately obtained history, Documenting clinical information in the electronic or other health record, Independently interpreting results (not separately reported) and communicating results to the patient/family/caregiver, or Care coordination (not separately reported).     Each patient to whom he or she provides medical services by telemedicine is:  (1) informed of the relationship between the physician and patient and the respective role of any other health care provider with respect to management of the patient; and (2) notified that he or she may decline to receive medical services by telemedicine and may withdraw from such care at any time.    Heena Boateng, KEVANC  Oncology/Hematology  [facility]

## 2025-03-06 ENCOUNTER — PATIENT MESSAGE (OUTPATIENT)
Dept: FAMILY MEDICINE | Facility: CLINIC | Age: 40
End: 2025-03-06
Payer: COMMERCIAL

## 2025-04-24 ENCOUNTER — TELEPHONE (OUTPATIENT)
Dept: FAMILY MEDICINE | Facility: CLINIC | Age: 40
End: 2025-04-24
Payer: COMMERCIAL

## 2025-04-24 DIAGNOSIS — Z00.00 ANNUAL PHYSICAL EXAM: Primary | ICD-10-CM

## 2025-04-24 DIAGNOSIS — Z13.1 SCREENING FOR DIABETES MELLITUS: ICD-10-CM

## 2025-04-24 NOTE — TELEPHONE ENCOUNTER
Are there any outstanding tasks in patient's chart?  labs    2. Do we have outstanding/pending referrals?  n    3. Has the patient been seen in an ER, Urgent Care, or admitted since last visit?  n    4. Has patient seen any other health care providers since last visit?  FER Boateng    5.  Has patient had any blood work or x-rays done since last visit?   Will complete labs for visit

## 2025-04-26 ENCOUNTER — LAB VISIT (OUTPATIENT)
Dept: LAB | Facility: HOSPITAL | Age: 40
End: 2025-04-26
Attending: NURSE PRACTITIONER
Payer: COMMERCIAL

## 2025-04-26 DIAGNOSIS — Z00.00 ANNUAL PHYSICAL EXAM: ICD-10-CM

## 2025-04-26 DIAGNOSIS — Z13.1 SCREENING FOR DIABETES MELLITUS: ICD-10-CM

## 2025-04-26 LAB
ALBUMIN SERPL-MCNC: 4.3 G/DL (ref 3.5–5)
ALBUMIN/GLOB SERPL: 1.2 RATIO (ref 1.1–2)
ALP SERPL-CCNC: 97 UNIT/L (ref 40–150)
ALT SERPL-CCNC: 48 UNIT/L (ref 0–55)
ANION GAP SERPL CALC-SCNC: 8 MEQ/L
AST SERPL-CCNC: 31 UNIT/L (ref 11–45)
BASOPHILS # BLD AUTO: 0.03 X10(3)/MCL
BASOPHILS NFR BLD AUTO: 0.6 %
BILIRUB SERPL-MCNC: 1 MG/DL
BUN SERPL-MCNC: 18.9 MG/DL (ref 8.9–20.6)
CALCIUM SERPL-MCNC: 9 MG/DL (ref 8.4–10.2)
CHLORIDE SERPL-SCNC: 112 MMOL/L (ref 98–107)
CHOLEST SERPL-MCNC: 176 MG/DL
CHOLEST/HDLC SERPL: 4 {RATIO} (ref 0–5)
CO2 SERPL-SCNC: 24 MMOL/L (ref 22–29)
CREAT SERPL-MCNC: 0.76 MG/DL (ref 0.72–1.25)
CREAT/UREA NIT SERPL: 25
EOSINOPHIL # BLD AUTO: 0.24 X10(3)/MCL (ref 0–0.9)
EOSINOPHIL NFR BLD AUTO: 4.8 %
ERYTHROCYTE [DISTWIDTH] IN BLOOD BY AUTOMATED COUNT: 12.6 % (ref 11.5–17)
EST. AVERAGE GLUCOSE BLD GHB EST-MCNC: 111.2 MG/DL
GFR SERPLBLD CREATININE-BSD FMLA CKD-EPI: >60 ML/MIN/1.73/M2
GLOBULIN SER-MCNC: 3.5 GM/DL (ref 2.4–3.5)
GLUCOSE SERPL-MCNC: 105 MG/DL (ref 74–100)
HBA1C MFR BLD: 5.5 %
HCT VFR BLD AUTO: 43.5 % (ref 42–52)
HDLC SERPL-MCNC: 47 MG/DL (ref 35–60)
HGB BLD-MCNC: 14.4 G/DL (ref 14–18)
IMM GRANULOCYTES # BLD AUTO: 0.01 X10(3)/MCL (ref 0–0.04)
IMM GRANULOCYTES NFR BLD AUTO: 0.2 %
LDLC SERPL CALC-MCNC: 96 MG/DL (ref 50–140)
LYMPHOCYTES # BLD AUTO: 1.6 X10(3)/MCL (ref 0.6–4.6)
LYMPHOCYTES NFR BLD AUTO: 31.9 %
MCH RBC QN AUTO: 31.2 PG (ref 27–31)
MCHC RBC AUTO-ENTMCNC: 33.1 G/DL (ref 33–36)
MCV RBC AUTO: 94.2 FL (ref 80–94)
MONOCYTES # BLD AUTO: 0.45 X10(3)/MCL (ref 0.1–1.3)
MONOCYTES NFR BLD AUTO: 9 %
NEUTROPHILS # BLD AUTO: 2.69 X10(3)/MCL (ref 2.1–9.2)
NEUTROPHILS NFR BLD AUTO: 53.5 %
PLATELET # BLD AUTO: 177 X10(3)/MCL (ref 130–400)
PMV BLD AUTO: 8.7 FL (ref 7.4–10.4)
POTASSIUM SERPL-SCNC: 3.8 MMOL/L (ref 3.5–5.1)
PROT SERPL-MCNC: 7.8 GM/DL (ref 6.4–8.3)
RBC # BLD AUTO: 4.62 X10(6)/MCL (ref 4.7–6.1)
SODIUM SERPL-SCNC: 144 MMOL/L (ref 136–145)
TRIGL SERPL-MCNC: 163 MG/DL (ref 34–140)
TSH SERPL-ACNC: 2.24 UIU/ML (ref 0.35–4.94)
VLDLC SERPL CALC-MCNC: 33 MG/DL
WBC # BLD AUTO: 5.02 X10(3)/MCL (ref 4.5–11.5)

## 2025-04-26 PROCEDURE — 80053 COMPREHEN METABOLIC PANEL: CPT

## 2025-04-26 PROCEDURE — 84443 ASSAY THYROID STIM HORMONE: CPT

## 2025-04-26 PROCEDURE — 83036 HEMOGLOBIN GLYCOSYLATED A1C: CPT

## 2025-04-26 PROCEDURE — 80061 LIPID PANEL: CPT

## 2025-04-26 PROCEDURE — 36415 COLL VENOUS BLD VENIPUNCTURE: CPT

## 2025-04-26 PROCEDURE — 85025 COMPLETE CBC W/AUTO DIFF WBC: CPT

## 2025-04-28 ENCOUNTER — RESULTS FOLLOW-UP (OUTPATIENT)
Dept: FAMILY MEDICINE | Facility: CLINIC | Age: 40
End: 2025-04-28

## 2025-05-01 ENCOUNTER — OFFICE VISIT (OUTPATIENT)
Dept: FAMILY MEDICINE | Facility: CLINIC | Age: 40
End: 2025-05-01
Payer: COMMERCIAL

## 2025-05-01 VITALS
SYSTOLIC BLOOD PRESSURE: 125 MMHG | HEART RATE: 85 BPM | BODY MASS INDEX: 34.86 KG/M2 | WEIGHT: 230 LBS | HEIGHT: 68 IN | DIASTOLIC BLOOD PRESSURE: 82 MMHG | OXYGEN SATURATION: 98 % | RESPIRATION RATE: 16 BRPM

## 2025-05-01 DIAGNOSIS — D50.9 IRON DEFICIENCY ANEMIA, UNSPECIFIED IRON DEFICIENCY ANEMIA TYPE: ICD-10-CM

## 2025-05-01 DIAGNOSIS — E53.8 FOLIC ACID DEFICIENCY: ICD-10-CM

## 2025-05-01 DIAGNOSIS — Z00.00 ANNUAL PHYSICAL EXAM: Primary | ICD-10-CM

## 2025-05-01 DIAGNOSIS — E66.01 MORBID OBESITY: ICD-10-CM

## 2025-05-01 DIAGNOSIS — E78.2 MIXED HYPERLIPIDEMIA: ICD-10-CM

## 2025-05-01 DIAGNOSIS — K76.0 STEATOSIS OF LIVER: ICD-10-CM

## 2025-05-01 PROCEDURE — 3044F HG A1C LEVEL LT 7.0%: CPT | Mod: CPTII,,, | Performed by: NURSE PRACTITIONER

## 2025-05-01 PROCEDURE — 3008F BODY MASS INDEX DOCD: CPT | Mod: CPTII,,, | Performed by: NURSE PRACTITIONER

## 2025-05-01 PROCEDURE — 3074F SYST BP LT 130 MM HG: CPT | Mod: CPTII,,, | Performed by: NURSE PRACTITIONER

## 2025-05-01 PROCEDURE — 1160F RVW MEDS BY RX/DR IN RCRD: CPT | Mod: CPTII,,, | Performed by: NURSE PRACTITIONER

## 2025-05-01 PROCEDURE — 99395 PREV VISIT EST AGE 18-39: CPT | Mod: ,,, | Performed by: NURSE PRACTITIONER

## 2025-05-01 PROCEDURE — 3079F DIAST BP 80-89 MM HG: CPT | Mod: CPTII,,, | Performed by: NURSE PRACTITIONER

## 2025-05-01 PROCEDURE — 1159F MED LIST DOCD IN RCRD: CPT | Mod: CPTII,,, | Performed by: NURSE PRACTITIONER

## 2025-05-01 NOTE — ASSESSMENT & PLAN NOTE
- Restarting Zepbound for weight management with gradual dose titration: Week 1: 2.5 mg, Week 2: 5 mg, Week 3: 7.5 mg (target dose).  - Explained the potential benefits of GLP/GIP medications beyond weight loss, including reduction in fatty liver.  - Patient to submit a refill request when ready to restart medication.  - Follow up in 3 months for weight management check.

## 2025-05-01 NOTE — PROGRESS NOTES
CM Note

 

CM Note                       

Notes:

Met with pt.   



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



Pt reports  coming to transport home.



No case management d/c needs identified d/t pt age and activity levels prior to admission.



Case management d/c poc:  Home Independent when medically stable.

 

Date Signed:  10/03/2017 11:00 AM

Electronically Signed By:Kenya Danielson RN Subjective:       Patient ID: Luz Larsen is a 40 y.o. male.    Chief Complaint: Annual Exam      History of Present Illness    CHIEF COMPLAINT:  Patient presents today for wellness visit and to review lab results    MEDICATIONS:  He currently takes folic acid and iron supplementation. He discontinued Zepbound approximately one month ago due to insurance coverage issues. While on Zepbound, he experienced nausea requiring 1-2 anti-nausea medications daily.  He continues Co Q10, Singulair, Zofran as needed, pantoprazole, rosuvastatin.    DIET AND WEIGHT MANAGEMENT:  He reports doing intermittent fasting with 24-hour fasts every Monday and plans to resume regular intermittent fasting schedule due to previously experienced positive results.    SPECIALTY CARE:  He no longer follows with hematology as they advised monitoring can be continued without further specialist visits.    LABS:  A1C was 5.5, indicating no diabetes or pre-diabetes. Thyroid level was 2.2. Cholesterol panel showed total cholesterol of 176, triglycerides decreased from 273 to 163, and LDL cholesterol was 96. Glucose level was 1.5. Liver enzymes were reported as good. Iron level in February was 100, folate was 19, and B12 was 423.        Review of Systems  Comprehensive review of systems negative except as stated in HPI    The patient's Health Maintenance was reviewed and the following appears to be due:   There are no preventive care reminders to display for this patient.    Past Medical History:  Past Medical History:   Diagnosis Date    Elevated liver enzymes     GERD (gastroesophageal reflux disease)     Hyperlipidemia     Obstructive sleep apnea     Steatosis of liver      Past Surgical History:   Procedure Laterality Date    TONSILLECTOMY  10/20/1998    TONSILLECTOMY AND ADENOIDECTOMY       Review of patient's allergies indicates:   Allergen Reactions    Azithromycin Rash     Medications Ordered Prior to Encounter[1]  Social History[2]  No  "family history on file.    Objective:       /82 (BP Location: Left arm)   Pulse 85   Resp 16   Ht 5' 8" (1.727 m)   Wt 104.3 kg (230 lb)   SpO2 98%   BMI 34.97 kg/m²      Physical Exam  Vitals and nursing note reviewed.   Constitutional:       Appearance: Normal appearance. He is obese.   HENT:      Head: Normocephalic and atraumatic.      Right Ear: Tympanic membrane, ear canal and external ear normal.      Left Ear: Tympanic membrane, ear canal and external ear normal.      Nose: Nose normal.      Mouth/Throat:      Mouth: Mucous membranes are moist.      Pharynx: Oropharynx is clear.   Eyes:      Extraocular Movements: Extraocular movements intact.      Conjunctiva/sclera: Conjunctivae normal.      Pupils: Pupils are equal, round, and reactive to light.   Cardiovascular:      Rate and Rhythm: Normal rate and regular rhythm.      Pulses: Normal pulses.      Heart sounds: Normal heart sounds.   Pulmonary:      Effort: Pulmonary effort is normal.      Breath sounds: Normal breath sounds.   Abdominal:      General: Abdomen is flat. Bowel sounds are normal.      Palpations: Abdomen is soft.   Musculoskeletal:         General: Normal range of motion.      Cervical back: Normal range of motion and neck supple.   Skin:     General: Skin is warm and dry.   Neurological:      General: No focal deficit present.      Mental Status: He is alert and oriented to person, place, and time.   Psychiatric:         Mood and Affect: Mood normal.         Behavior: Behavior normal.         Thought Content: Thought content normal.         Judgment: Judgment normal.         Labs  Lab Visit on 04/26/2025   Component Date Value Ref Range Status    Sodium 04/26/2025 144  136 - 145 mmol/L Final    Potassium 04/26/2025 3.8  3.5 - 5.1 mmol/L Final    Chloride 04/26/2025 112 (H)  98 - 107 mmol/L Final    CO2 04/26/2025 24  22 - 29 mmol/L Final    Glucose 04/26/2025 105 (H)  74 - 100 mg/dL Final    Blood Urea Nitrogen 04/26/2025 18.9  " 8.9 - 20.6 mg/dL Final    Creatinine 04/26/2025 0.76  0.72 - 1.25 mg/dL Final    Calcium 04/26/2025 9.0  8.4 - 10.2 mg/dL Final    Protein Total 04/26/2025 7.8  6.4 - 8.3 gm/dL Final    Albumin 04/26/2025 4.3  3.5 - 5.0 g/dL Final    Globulin 04/26/2025 3.5  2.4 - 3.5 gm/dL Final    Albumin/Globulin Ratio 04/26/2025 1.2  1.1 - 2.0 ratio Final    Bilirubin Total 04/26/2025 1.0  <=1.5 mg/dL Final    ALP 04/26/2025 97  40 - 150 unit/L Final    ALT 04/26/2025 48  0 - 55 unit/L Final    AST 04/26/2025 31  11 - 45 unit/L Final    eGFR 04/26/2025 >60  mL/min/1.73/m2 Final    Estimated GFR calculated using the CKD-EPI creatinine (2021) equation.    Anion Gap 04/26/2025 8.0  mEq/L Final    BUN/Creatinine Ratio 04/26/2025 25   Final    Cholesterol Total 04/26/2025 176  <=200 mg/dL Final    HDL Cholesterol 04/26/2025 47  35 - 60 mg/dL Final    Triglyceride 04/26/2025 163 (H)  34 - 140 mg/dL Final    Cholesterol/HDL Ratio 04/26/2025 4  0 - 5 Final    Very Low Density Lipoprotein 04/26/2025 33   Final    LDL Cholesterol 04/26/2025 96.00  50.00 - 140.00 mg/dL Final    LDL calculated using the Friedewald equation.    TSH 04/26/2025 2.240  0.350 - 4.940 uIU/mL Final    Hemoglobin A1c 04/26/2025 5.5  <=7.0 % Final    Estimated Average Glucose 04/26/2025 111.2  mg/dL Final    WBC 04/26/2025 5.02  4.50 - 11.50 x10(3)/mcL Final    RBC 04/26/2025 4.62 (L)  4.70 - 6.10 x10(6)/mcL Final    Hgb 04/26/2025 14.4  14.0 - 18.0 g/dL Final    Hct 04/26/2025 43.5  42.0 - 52.0 % Final    MCV 04/26/2025 94.2 (H)  80.0 - 94.0 fL Final    MCH 04/26/2025 31.2 (H)  27.0 - 31.0 pg Final    MCHC 04/26/2025 33.1  33.0 - 36.0 g/dL Final    RDW 04/26/2025 12.6  11.5 - 17.0 % Final    Platelet 04/26/2025 177  130 - 400 x10(3)/mcL Final    MPV 04/26/2025 8.7  7.4 - 10.4 fL Final    Neut % 04/26/2025 53.5  % Final    Lymph % 04/26/2025 31.9  % Final    Mono % 04/26/2025 9.0  % Final    Eos % 04/26/2025 4.8  % Final    Basophil % 04/26/2025 0.6  % Final    Imm  Grans % 04/26/2025 0.2  % Final    Neut # 04/26/2025 2.69  2.1 - 9.2 x10(3)/mcL Final    Lymph # 04/26/2025 1.60  0.6 - 4.6 x10(3)/mcL Final    Mono # 04/26/2025 0.45  0.1 - 1.3 x10(3)/mcL Final    Eos # 04/26/2025 0.24  0 - 0.9 x10(3)/mcL Final    Baso # 04/26/2025 0.03  <=0.2 x10(3)/mcL Final    Imm Gran # 04/26/2025 0.01  0.00 - 0.04 x10(3)/mcL Final   Lab Visit on 02/24/2025   Component Date Value Ref Range Status    Iron Binding Capacity Unsaturated 02/24/2025 244 (H)  60 - 240 ug/dL Final    Iron Level 02/24/2025 100  65 - 175 ug/dL Final    Transferrin 02/24/2025 306  174 - 364 mg/dL Final    Iron Binding Capacity Total 02/24/2025 344  250 - 450 ug/dL Final    Iron Saturation 02/24/2025 29  20 - 50 % Final    Ferritin Level 02/24/2025 81.50  21.81 - 274.66 ng/mL Final    Folate Level 02/24/2025 19.0  7.0 - 31.4 ng/mL Final    Vitamin B12 02/24/2025 423  213 - 816 pg/mL Final    WBC 02/24/2025 6.81  4.50 - 11.50 x10(3)/mcL Final    RBC 02/24/2025 4.65 (L)  4.70 - 6.10 x10(6)/mcL Final    Hgb 02/24/2025 14.6  14.0 - 18.0 g/dL Final    Hct 02/24/2025 44.3  42.0 - 52.0 % Final    MCV 02/24/2025 95.3 (H)  80.0 - 94.0 fL Final    MCH 02/24/2025 31.4 (H)  27.0 - 31.0 pg Final    MCHC 02/24/2025 33.0  33.0 - 36.0 g/dL Final    RDW 02/24/2025 12.0  11.5 - 17.0 % Final    Platelet 02/24/2025 201  130 - 400 x10(3)/mcL Final    MPV 02/24/2025 9.1  7.4 - 10.4 fL Final    Neut % 02/24/2025 53.1  % Final    Lymph % 02/24/2025 32.9  % Final    Mono % 02/24/2025 8.4  % Final    Eos % 02/24/2025 5.0  % Final    Basophil % 02/24/2025 0.6  % Final    Imm Grans % 02/24/2025 0.0  % Final    Neut # 02/24/2025 3.62  2.1 - 9.2 x10(3)/mcL Final    Lymph # 02/24/2025 2.24  0.6 - 4.6 x10(3)/mcL Final    Mono # 02/24/2025 0.57  0.1 - 1.3 x10(3)/mcL Final    Eos # 02/24/2025 0.34  0 - 0.9 x10(3)/mcL Final    Baso # 02/24/2025 0.04  <=0.2 x10(3)/mcL Final    Imm Gran # 02/24/2025 0.00  0.00 - 0.04 x10(3)/mcL Final   Office Visit on  11/20/2024   Component Date Value Ref Range Status    Iron Binding Capacity Unsaturated 11/20/2024 207  60 - 240 ug/dL Final    Iron Level 11/20/2024 86  65 - 175 ug/dL Final    Transferrin 11/20/2024 275  174 - 364 mg/dL Final    Iron Binding Capacity Total 11/20/2024 293  250 - 450 ug/dL Final    Iron Saturation 11/20/2024 29  20 - 50 % Final    Vitamin B12 11/20/2024 349  213 - 816 pg/mL Final    Folate Level 11/20/2024 6.9 (L)  7.0 - 31.4 ng/mL Final    Ferritin Level 11/20/2024 111.40  21.81 - 274.66 ng/mL Final    WBC 11/20/2024 5.48  4.50 - 11.50 x10(3)/mcL Final    RBC 11/20/2024 4.40 (L)  4.70 - 6.10 x10(6)/mcL Final    Hgb 11/20/2024 14.2  14.0 - 18.0 g/dL Final    Hct 11/20/2024 42.2  42.0 - 52.0 % Final    MCV 11/20/2024 95.9 (H)  80.0 - 94.0 fL Final    MCH 11/20/2024 32.3 (H)  27.0 - 31.0 pg Final    MCHC 11/20/2024 33.6  33.0 - 36.0 g/dL Final    RDW 11/20/2024 11.9  11.5 - 17.0 % Final    Platelet 11/20/2024 167  130 - 400 x10(3)/mcL Final    MPV 11/20/2024 9.2  7.4 - 10.4 fL Final    Neut % 11/20/2024 55.5  % Final    Lymph % 11/20/2024 31.2  % Final    Mono % 11/20/2024 8.4  % Final    Eos % 11/20/2024 4.2  % Final    Basophil % 11/20/2024 0.5  % Final    Lymph # 11/20/2024 1.71  0.6 - 4.6 x10(3)/mcL Final    Neut # 11/20/2024 3.04  2.1 - 9.2 x10(3)/mcL Final    Mono # 11/20/2024 0.46  0.1 - 1.3 x10(3)/mcL Final    Eos # 11/20/2024 0.23  0 - 0.9 x10(3)/mcL Final    Baso # 11/20/2024 0.03  <=0.2 x10(3)/mcL Final    Imm Gran # 11/20/2024 0.01  0 - 0.04 x10(3)/mcL Final    Imm Grans % 11/20/2024 0.2  % Final       Assessment and Plan     Assessment & Plan    E66.01 Morbid obesity  Z00.00 Annual physical exam  E78.2 Mixed hyperlipidemia  D50.9 Iron deficiency anemia, unspecified iron deficiency anemia type  E53.8 Folic acid deficiency  G47.33 Obstructive sleep apnea syndrome  K76.0 Steatosis of liver    IMPRESSION:  - Reviewed lab results: hemoglobin 14.4, hematocrit 43.5, B12 423, A1C 5.5, thyroid  2.2, cholesterol 176, triglycerides improved from 273 to 163, LDL 96, glucose 1.5.  - Iron, folate, and B12 levels from February hematology check were improved.  - Weight increased from 220 in February to 230 today.  - Liver enzymes were good, indicating improvement in fatty liver.    MORBID OBESITY:  - Restarting Zepbound for weight management with gradual dose titration: Week 1: 2.5 mg, Week 2: 5 mg, Week 3: 7.5 mg (target dose).  - Explained the potential benefits of GLP/GIP medications beyond weight loss, including reduction in fatty liver.  - Patient to submit a refill request when ready to restart medication.  - Follow up in 3 months for weight management check.    MIXED HYPERLIPIDEMIA:   - improved with rosuvastatin, continue rosuvastatin 20 mg daily    FOLIC ACID DEFICIENCY:  - Continue folic acid supplementation.    STEATOSIS OF LIVER:  - Addressed as part of weight management plan with Zepbound (GLP/GIP medication), which should help reduce fatty liver.    LIFESTYLE CHANGES:  - Patient to continue with intermittent fasting, including 24-hour fasts on Mondays with potential extension to 48-hour fasts.  - Provided information on the impact of diet and food additives on health, comparing American and international dietary practices.  - Contact the office if needed before the scheduled follow-up.           1. Annual physical exam  Overview:  Annual exam yearly in April      2. Mixed hyperlipidemia  Overview:  Rosuvastatin 20 mg daily      3. Iron deficiency anemia, unspecified iron deficiency anemia type  Overview:  04/18/22 16:24  RBC: 4.49  Hemoglobin: 13.7  Hematocrit: 42.0    04/22/23 10:17  RBC: 4.54 (L)  Hemoglobin: 12.6 (L)  Hematocrit: 39.2 (L)  Iron: 82  TIBC: 345  Iron Binding Capacity Unsaturated: 263 (H)  Transferrin: 321  Iron Saturation: 24    07/25/23 06:48  RBC: 4.51 (L)  Hemoglobin: 12.9 (L)  Hematocrit: 40.6 (L)    08/12/2023, 08/13/2023, 08/14/2023 - Stool OB negative    04/29/24 08:05  RBC:  4.41 (L)  Hemoglobin: 12.6 (L)  Hematocrit: 39.2 (L)  Iron: 37 (L)  TIBC: 343  Iron Binding Capacity Unsaturated: 306 (H)  Transferrin: 309  Iron Saturation: 11 (L)    05/20/2024 - Established with hematology Dr Katharine Webb    07/18/2024 - colonoscopy with Dr. Santana--normal mucosa in the terminal ileum, polyp in the rectum, grade 1/stage I internal hemorrhoids.     11/20/2024 - H&H 14.2 and 42.2, iron 86, folate 6.9            4. Folic acid deficiency    5. Morbid obesity  Overview:  04/25/2023 - 251 lb, BMI 38.29, start Wegovy and titrate up  07/27/2023 - 224 lb, BMI 34.09, increase Wegovy to maintenance dose of 2.4 mg weekly  02/01/2024 - 215 lb, BMI 32.69, continue maintenance dose Wegovy 2.4 mg weekly  04/30/2024 - 224 lb, BMI 34.06  11/20/2024 - 220 lb, BMI 33.45, discontinue Wegovy, trial Zepbound (only took for 1 month)  02/24/2025 - 220 lb, BMI 33.45, restart Zepbound, RX Zofran for nausea   05/01/2025 - 230 lb, BMI 34.97, Zepbound on hold x 1 month due to finances    Assessment & Plan:  - Restarting Zepbound for weight management with gradual dose titration: Week 1: 2.5 mg, Week 2: 5 mg, Week 3: 7.5 mg (target dose).  - Explained the potential benefits of GLP/GIP medications beyond weight loss, including reduction in fatty liver.  - Patient to submit a refill request when ready to restart medication.  - Follow up in 3 months for weight management check.      6. Steatosis of liver  Overview:  04/14/2021 - ultrasound liver with hepatic steatosis noted             Follow up in about 3 months (around 8/1/2025) for follow up.     This note was generated with the assistance of ambient listening technology. Verbal consent was obtained by the patient and accompanying visitor(s) for the recording of patient appointment to facilitate this note. I attest to having reviewed and edited the generated note for accuracy, though some syntax or spelling errors may persist. Please contact the author of this note for any  clarification.            [1]   Current Outpatient Medications on File Prior to Visit   Medication Sig Dispense Refill    coenzyme Q10 200 mg capsule Take 200 mg by mouth once daily.      folic acid (FOLVITE) 1 MG tablet Take 1 tablet (1 mg total) by mouth once daily. 30 tablet 11    iron-vitamin C 100-250 mg, ICAR-C, (ICAR-C) 100-250 mg Tab Take 1 tablet by mouth Daily. 30 tablet 11    montelukast (SINGULAIR) 10 mg tablet Take 1 tablet (10 mg total) by mouth every evening. 90 tablet 3    ondansetron (ZOFRAN-ODT) 8 MG TbDL Take 1 tablet (8 mg total) by mouth every 6 (six) hours as needed (nausea). 30 tablet 11    pantoprazole (PROTONIX) 40 MG tablet Take 1 tablet (40 mg total) by mouth once daily. 90 tablet 3    rosuvastatin (CRESTOR) 20 MG tablet Take 1 tablet (20 mg total) by mouth once daily. 90 tablet 3    tirzepatide, weight loss, (ZEPBOUND) 7.5 mg/0.5 mL PnIj Inject 7.5 mg into the skin every 7 days. 2 mL 2    valACYclovir (VALTREX) 1000 MG tablet Take 2 tablets (2,000 mg total) by mouth every 12 (twelve) hours. 30 tablet 11     No current facility-administered medications on file prior to visit.   [2]   Social History  Socioeconomic History    Marital status:    Tobacco Use    Smoking status: Never     Passive exposure: Past    Smokeless tobacco: Never   Substance and Sexual Activity    Alcohol use: Not Currently     Comment: seldom    Drug use: Never    Sexual activity: Yes     Partners: Female     Birth control/protection: None     Social Drivers of Health     Financial Resource Strain: Patient Declined (1/31/2024)    Overall Financial Resource Strain (CARDIA)     Difficulty of Paying Living Expenses: Patient declined   Food Insecurity: Patient Declined (1/31/2024)    Hunger Vital Sign     Worried About Running Out of Food in the Last Year: Patient declined     Ran Out of Food in the Last Year: Patient declined   Transportation Needs: Patient Declined (1/31/2024)    PRAPARE - Transportation     Lack  of Transportation (Medical): Patient declined     Lack of Transportation (Non-Medical): Patient declined   Physical Activity: Insufficiently Active (1/31/2024)    Exercise Vital Sign     Days of Exercise per Week: 3 days     Minutes of Exercise per Session: 10 min   Stress: No Stress Concern Present (1/31/2024)    [employer] [place] [employer] - Occupational Stress Questionnaire     Feeling of Stress : Only a little   Housing Stability: Patient Declined (1/31/2024)    Housing Stability Vital Sign     Unable to Pay for Housing in the Last Year: Patient declined     Number of Places Lived in the Last Year: 1     Unstable Housing in the Last Year: Patient declined

## 2025-07-28 ENCOUNTER — PATIENT MESSAGE (OUTPATIENT)
Dept: FAMILY MEDICINE | Facility: CLINIC | Age: 40
End: 2025-07-28
Payer: COMMERCIAL

## 2025-08-04 ENCOUNTER — OFFICE VISIT (OUTPATIENT)
Dept: FAMILY MEDICINE | Facility: CLINIC | Age: 40
End: 2025-08-04
Payer: COMMERCIAL

## 2025-08-04 ENCOUNTER — PATIENT MESSAGE (OUTPATIENT)
Dept: FAMILY MEDICINE | Facility: CLINIC | Age: 40
End: 2025-08-04

## 2025-08-04 VITALS
BODY MASS INDEX: 37.44 KG/M2 | OXYGEN SATURATION: 95 % | RESPIRATION RATE: 16 BRPM | WEIGHT: 247 LBS | SYSTOLIC BLOOD PRESSURE: 123 MMHG | DIASTOLIC BLOOD PRESSURE: 73 MMHG | HEIGHT: 68 IN | HEART RATE: 80 BPM

## 2025-08-04 DIAGNOSIS — K76.0 STEATOSIS OF LIVER: ICD-10-CM

## 2025-08-04 DIAGNOSIS — E66.01 MORBID OBESITY: Primary | ICD-10-CM

## 2025-08-04 DIAGNOSIS — G47.33 OBSTRUCTIVE SLEEP APNEA SYNDROME: ICD-10-CM

## 2025-08-04 PROCEDURE — 1160F RVW MEDS BY RX/DR IN RCRD: CPT | Mod: CPTII,,, | Performed by: NURSE PRACTITIONER

## 2025-08-04 PROCEDURE — 1159F MED LIST DOCD IN RCRD: CPT | Mod: CPTII,,, | Performed by: NURSE PRACTITIONER

## 2025-08-04 PROCEDURE — 3078F DIAST BP <80 MM HG: CPT | Mod: CPTII,,, | Performed by: NURSE PRACTITIONER

## 2025-08-04 PROCEDURE — 3074F SYST BP LT 130 MM HG: CPT | Mod: CPTII,,, | Performed by: NURSE PRACTITIONER

## 2025-08-04 PROCEDURE — 3008F BODY MASS INDEX DOCD: CPT | Mod: CPTII,,, | Performed by: NURSE PRACTITIONER

## 2025-08-04 PROCEDURE — 3044F HG A1C LEVEL LT 7.0%: CPT | Mod: CPTII,,, | Performed by: NURSE PRACTITIONER

## 2025-08-04 PROCEDURE — 99214 OFFICE O/P EST MOD 30 MIN: CPT | Mod: ,,, | Performed by: NURSE PRACTITIONER

## 2025-08-04 PROCEDURE — G2211 COMPLEX E/M VISIT ADD ON: HCPCS | Mod: ,,, | Performed by: NURSE PRACTITIONER

## 2025-08-04 RX ORDER — TOPIRAMATE 25 MG/1
TABLET, FILM COATED ORAL
Qty: 162 TABLET | Refills: 0 | Status: SHIPPED | OUTPATIENT
Start: 2025-08-04 | End: 2025-10-01

## 2025-08-04 NOTE — ASSESSMENT & PLAN NOTE
- Patient to continue efforts for weight loss.  - Started Topamax 25 mg daily for 2 weeks, then increase to 25 mg twice daily for 2 weeks, then increase to 50 mg twice daily for appetite suppression as alternative approach.  - Explained Topamax is primarily used for seizures, migraines, and mood stabilization, with appetite suppression as a side effect.  - Potential side effects include sleepiness, drowsiness, and brain fogginess.  - Contact office if Topamax 50 mg twice daily dose is effective to receive a new prescription.

## 2025-08-04 NOTE — PROGRESS NOTES
"Subjective:       Patient ID: Luz Larsen is a 40 y.o. male.    Chief Complaint: Follow-up (3 month fu)      History of Present Illness    CHIEF COMPLAINT:  Patient presents today for three month follow up regarding weight loss management.    WEIGHT MANAGEMENT:  He discontinued Wegovy injections due to cost after being previously titrated to 2.4 mg dose with consistent adherence. He is not currently implementing any dietary modifications due to recent vacation, though he and his partner intend to pursue weight management strategies.    SLEEP AND FATIGUE:  He reports chronic fatigue and daytime tiredness. He can easily fall asleep when lying down but can maintain wakefulness when necessary. He uses CPAP nightly and experiences morning congestion and general malaise when CPAP is not utilized.        Review of Systems  Comprehensive review of systems negative except as stated in HPI    The patient's Health Maintenance was reviewed and the following appears to be due:   There are no preventive care reminders to display for this patient.    Past Medical History:  Past Medical History:   Diagnosis Date    Elevated liver enzymes     GERD (gastroesophageal reflux disease)     Hyperlipidemia     Obstructive sleep apnea     Steatosis of liver      Past Surgical History:   Procedure Laterality Date    TONSILLECTOMY  10/20/1998    TONSILLECTOMY AND ADENOIDECTOMY       Review of patient's allergies indicates:   Allergen Reactions    Azithromycin Rash     Medications Ordered Prior to Encounter[1]  Social History[2]  No family history on file.    Objective:       /73 (BP Location: Left arm, Patient Position: Sitting)   Pulse 80   Resp 16   Ht 5' 8" (1.727 m)   Wt 112 kg (247 lb)   SpO2 95%   BMI 37.56 kg/m²      Physical Exam  Vitals and nursing note reviewed.   Constitutional:       Appearance: Normal appearance. He is obese.   HENT:      Head: Normocephalic and atraumatic.      Right Ear: Tympanic membrane, ear " canal and external ear normal.      Left Ear: Tympanic membrane, ear canal and external ear normal.      Nose: Nose normal.      Mouth/Throat:      Mouth: Mucous membranes are moist.      Pharynx: Oropharynx is clear.   Eyes:      Extraocular Movements: Extraocular movements intact.      Conjunctiva/sclera: Conjunctivae normal.      Pupils: Pupils are equal, round, and reactive to light.   Cardiovascular:      Rate and Rhythm: Normal rate and regular rhythm.      Pulses: Normal pulses.      Heart sounds: Normal heart sounds.   Pulmonary:      Effort: Pulmonary effort is normal.      Breath sounds: Normal breath sounds.   Musculoskeletal:         General: Normal range of motion.      Cervical back: Normal range of motion and neck supple.   Skin:     General: Skin is warm and dry.   Neurological:      General: No focal deficit present.      Mental Status: He is alert and oriented to person, place, and time.   Psychiatric:         Mood and Affect: Mood normal.         Behavior: Behavior normal.         Thought Content: Thought content normal.         Judgment: Judgment normal.             Assessment and Plan     Assessment & Plan    E66.01 Morbid obesity  K76.0 Steatosis of liver  G47.33 Obstructive sleep apnea syndrome    IMPRESSION:  - Considered restarting semaglutide injections for weight loss, but cost concerns reported.  - Discussed compound pharmacy options for semaglutide at lower doses, starting at 0.25 mg.  - Calculated FIB4 score for fatty liver disease risk stratification; currently at low risk (0.99).  - Noted weight loss would help prevent further liver fibrosis.    MORBID OBESITY:  - Patient to continue efforts for weight loss.  - Started Topamax 25 mg daily for 2 weeks, then increase to 25 mg twice daily for 2 weeks, then increase to 50 mg twice daily for appetite suppression as alternative approach.  - Explained Topamax is primarily used for seizures, migraines, and mood stabilization, with appetite  suppression as a side effect.  - Potential side effects include sleepiness, drowsiness, and brain fogginess.  - Contact office if Topamax 50 mg twice daily dose is effective to receive a new prescription.    STEATOSIS OF LIVER:  - Explained FIB4 score interpretation for fatty liver disease risk assessment.    LIFESTYLE CHANGES:  - Follow up in 3 months.           1. Morbid obesity  Overview:  04/25/2023 - 251 lb, BMI 38.29, start Wegovy and titrate up  07/27/2023 - 224 lb, BMI 34.09, increase Wegovy to maintenance dose of 2.4 mg weekly  02/01/2024 - 215 lb, BMI 32.69, continue maintenance dose Wegovy 2.4 mg weekly  04/30/2024 - 224 lb, BMI 34.06  11/20/2024 - 220 lb, BMI 33.45, discontinue Wegovy, trial Zepbound (only took for 1 month)  02/24/2025 - 220 lb, BMI 33.45, restart Zepbound, RX Zofran for nausea   05/01/2025 - 230 lb, BMI 34.97, off Zepbound since April 08/04/2025 - 247 lb, BMI 37.56, start Topamax    Orders:  -     topiramate (TOPAMAX) 25 MG tablet; Take 1 tablet (25 mg total) by mouth once daily for 14 days, THEN 1 tablet (25 mg total) 2 (two) times daily for 14 days, THEN 2 tablets (50 mg total) 2 (two) times daily.  Dispense: 162 tablet; Refill: 0    2. Steatosis of liver  Overview:  04/14/2021 - ultrasound liver with hepatic steatosis noted    Assessment & Plan:  FIB-4 Calculation: 0.99 at 4/26/2025  8:35 AM  Calculated from:  SGOT/AST: 31 unit/L at 4/26/2025  8:35 AM  SGPT/ALT: 48 unit/L at 4/26/2025  8:35 AM  Platelets: 177 x10(3)/mcL at 4/26/2025  8:35 AM  Age: 40 years     FIB-4 below 1.30 is considered as low-risk for advanced fibrosis  FIB-4 over 2.67 is considered as high-risk for advanced fibrosis  FIB-4 values between 1.30 and 2.67 are considered as intermediate-risk of advanced fibrosis for ages 36-64.     For ages > 64 the cut-off for low-risk goes to < 2.  This is a screening tool and clinical judgement should be used in the interpretation of these results.         3. Obstructive sleep  apnea syndrome  Overview:  Home sleep study with [facility] 04/20/2021  AHI 28.1  Auto Pap 5-20 cm H20              Follow up in about 3 months (around 11/4/2025) for follow up.     This note was generated with the assistance of ambient listening technology. Verbal consent was obtained by the patient and accompanying visitor(s) for the recording of patient appointment to facilitate this note. I attest to having reviewed and edited the generated note for accuracy, though some syntax or spelling errors may persist. Please contact the author of this note for any clarification.            [1]   Current Outpatient Medications on File Prior to Visit   Medication Sig Dispense Refill    coenzyme Q10 200 mg capsule Take 200 mg by mouth once daily.      folic acid (FOLVITE) 1 MG tablet Take 1 tablet (1 mg total) by mouth once daily. 30 tablet 11    iron-vitamin C 100-250 mg, ICAR-C, (ICAR-C) 100-250 mg Tab Take 1 tablet by mouth Daily. 30 tablet 11    montelukast (SINGULAIR) 10 mg tablet Take 1 tablet (10 mg total) by mouth every evening. 90 tablet 3    ondansetron (ZOFRAN-ODT) 8 MG TbDL Take 1 tablet (8 mg total) by mouth every 6 (six) hours as needed (nausea). 30 tablet 11    pantoprazole (PROTONIX) 40 MG tablet Take 1 tablet (40 mg total) by mouth once daily. 90 tablet 3    rosuvastatin (CRESTOR) 20 MG tablet Take 1 tablet (20 mg total) by mouth once daily. 90 tablet 3    valACYclovir (VALTREX) 1000 MG tablet Take 2 tablets (2,000 mg total) by mouth every 12 (twelve) hours. 30 tablet 11    [DISCONTINUED] tirzepatide, weight loss, (ZEPBOUND) 7.5 mg/0.5 mL PnIj Inject 7.5 mg into the skin every 7 days. (Patient not taking: Reported on 8/4/2025) 2 mL 2     No current facility-administered medications on file prior to visit.   [2]   Social History  Socioeconomic History    Marital status:    Tobacco Use    Smoking status: Never     Passive exposure: Past    Smokeless tobacco: Never   Substance and Sexual  Activity    Alcohol use: Not Currently     Comment: seldom    Drug use: Never    Sexual activity: Yes     Partners: Female     Birth control/protection: None     Social Drivers of Health     Financial Resource Strain: Low Risk  (8/4/2025)    Overall Financial Resource Strain (CARDIA)     Difficulty of Paying Living Expenses: Not hard at all   Food Insecurity: No Food Insecurity (8/4/2025)    Hunger Vital Sign     Worried About Running Out of Food in the Last Year: Never true     Ran Out of Food in the Last Year: Never true   Transportation Needs: No Transportation Needs (8/4/2025)    PRAPARE - Transportation     Lack of Transportation (Medical): No     Lack of Transportation (Non-Medical): No   Physical Activity: Insufficiently Active (8/4/2025)    Exercise Vital Sign     Days of Exercise per Week: 1 day     Minutes of Exercise per Session: 20 min   Stress: No Stress Concern Present (8/4/2025)    [employer] - Occupational Stress Questionnaire     Feeling of Stress : Only a little   Housing Stability: Low Risk  (8/4/2025)    Housing Stability Vital Sign     Unable to Pay for Housing in the Last Year: No     Number of Times Moved in the Last Year: 0     Homeless in the Last Year: No

## 2025-08-04 NOTE — ASSESSMENT & PLAN NOTE
FIB-4 Calculation: 0.99 at 4/26/2025  8:35 AM  Calculated from:  SGOT/AST: 31 unit/L at 4/26/2025  8:35 AM  SGPT/ALT: 48 unit/L at 4/26/2025  8:35 AM  Platelets: 177 x10(3)/mcL at 4/26/2025  8:35 AM  Age: 40 years     FIB-4 below 1.30 is considered as low-risk for advanced fibrosis  FIB-4 over 2.67 is considered as high-risk for advanced fibrosis  FIB-4 values between 1.30 and 2.67 are considered as intermediate-risk of advanced fibrosis for ages 36-64.     For ages > 64 the cut-off for low-risk goes to < 2.  This is a screening tool and clinical judgement should be used in the interpretation of these results.